# Patient Record
Sex: FEMALE | Race: OTHER | URBAN - METROPOLITAN AREA
[De-identification: names, ages, dates, MRNs, and addresses within clinical notes are randomized per-mention and may not be internally consistent; named-entity substitution may affect disease eponyms.]

---
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
## 2023-11-05 ENCOUNTER — HOSPITAL ENCOUNTER (EMERGENCY)
Facility: HOSPITAL | Age: 23
Discharge: HOME | End: 2023-11-05
Attending: STUDENT IN AN ORGANIZED HEALTH CARE EDUCATION/TRAINING PROGRAM
Payer: COMMERCIAL

## 2023-11-05 VITALS
OXYGEN SATURATION: 98 % | SYSTOLIC BLOOD PRESSURE: 128 MMHG | WEIGHT: 148 LBS | TEMPERATURE: 98.4 F | HEIGHT: 63 IN | RESPIRATION RATE: 16 BRPM | BODY MASS INDEX: 26.22 KG/M2 | DIASTOLIC BLOOD PRESSURE: 74 MMHG | HEART RATE: 73 BPM

## 2023-11-05 DIAGNOSIS — L23.9 ALLERGIC CONTACT DERMATITIS, UNSPECIFIED TRIGGER: Primary | ICD-10-CM

## 2023-11-05 DIAGNOSIS — M25.562 ACUTE PAIN OF LEFT KNEE: ICD-10-CM

## 2023-11-05 PROCEDURE — 99283 EMERGENCY DEPT VISIT LOW MDM: CPT

## 2023-11-05 RX ORDER — TRIAMCINOLONE ACETONIDE 5 MG/G
OINTMENT TOPICAL
Qty: 15 G | Refills: 1 | Status: SHIPPED | OUTPATIENT
Start: 2023-11-05 | End: 2023-11-12

## 2023-11-05 ASSESSMENT — LIFESTYLE VARIABLES
HOW MANY STANDARD DRINKS CONTAINING ALCOHOL DO YOU HAVE ON A TYPICAL DAY: 1 OR 2
HOW OFTEN DO YOU HAVE A DRINK CONTAINING ALCOHOL: MONTHLY OR LESS

## 2023-11-05 NOTE — ED NOTES
Patient does not appear to be in any acute distress/shows no evidence of clinical instability at this time. Provider has reviewed discharge instructions with the patient/family. The patient/family verbalized understanding instructions as well as need for follow up for any further symptoms. Discharge papers given, education provided, and any questions answered.         Milagros Guillen RN  11/05/23 [id number]

## 2024-08-12 ENCOUNTER — APPOINTMENT (OUTPATIENT)
Facility: HOSPITAL | Age: 24
End: 2024-08-12
Payer: COMMERCIAL

## 2024-08-12 ENCOUNTER — HOSPITAL ENCOUNTER (EMERGENCY)
Facility: HOSPITAL | Age: 24
Discharge: HOME | End: 2024-08-12
Attending: EMERGENCY MEDICINE
Payer: COMMERCIAL

## 2024-08-12 VITALS
WEIGHT: 160 LBS | TEMPERATURE: 98.3 F | BODY MASS INDEX: 28.35 KG/M2 | OXYGEN SATURATION: 97 % | RESPIRATION RATE: 16 BRPM | HEART RATE: 60 BPM | HEIGHT: 63 IN | SYSTOLIC BLOOD PRESSURE: 117 MMHG | DIASTOLIC BLOOD PRESSURE: 73 MMHG

## 2024-08-12 DIAGNOSIS — R10.32 LEFT LOWER QUADRANT ABDOMINAL PAIN: Primary | ICD-10-CM

## 2024-08-12 LAB
ALBUMIN SERPL-MCNC: 4.5 G/DL (ref 3.5–5.2)
ALBUMIN/GLOB SERPL: 1.5 (ref 1.1–2.2)
ALP SERPL-CCNC: 70 U/L (ref 35–104)
ALT SERPL-CCNC: 8 U/L (ref 10–35)
ANION GAP SERPL CALC-SCNC: 12 MMOL/L (ref 5–15)
APPEARANCE UR: CLEAR
AST SERPL-CCNC: 20 U/L (ref 10–35)
BACTERIA URNS QL MICRO: ABNORMAL /HPF
BASOPHILS # BLD: 0 K/UL (ref 0–1)
BASOPHILS NFR BLD: 1 % (ref 0–1)
BILIRUB SERPL-MCNC: 0.6 MG/DL (ref 0.2–1)
BILIRUB UR QL: NEGATIVE
BUN SERPL-MCNC: 8 MG/DL (ref 6–20)
BUN/CREAT SERPL: 16 (ref 12–20)
CALCIUM SERPL-MCNC: 8.9 MG/DL (ref 8.6–10)
CHLORIDE SERPL-SCNC: 104 MMOL/L (ref 98–107)
CO2 SERPL-SCNC: 24 MMOL/L (ref 22–29)
COLOR UR: ABNORMAL
CREAT SERPL-MCNC: 0.51 MG/DL (ref 0.5–0.9)
DIFFERENTIAL METHOD BLD: ABNORMAL
EOSINOPHIL # BLD: 0.1 K/UL (ref 0–0.4)
EOSINOPHIL NFR BLD: 1 %
EPITH CASTS URNS QL MICRO: ABNORMAL /LPF
ERYTHROCYTE [DISTWIDTH] IN BLOOD BY AUTOMATED COUNT: 12.8 % (ref 11.5–14.5)
GLOBULIN SER CALC-MCNC: 3 G/DL (ref 2–4)
GLUCOSE SERPL-MCNC: 91 MG/DL (ref 65–100)
GLUCOSE UR STRIP.AUTO-MCNC: NEGATIVE MG/DL
HCG UR QL: NEGATIVE
HCT VFR BLD AUTO: 41.3 % (ref 35–47)
HGB BLD-MCNC: 13.8 G/DL (ref 11.5–16)
HGB UR QL STRIP: ABNORMAL
IMM GRANULOCYTES # BLD AUTO: 0 K/UL (ref 0–0.04)
IMM GRANULOCYTES NFR BLD AUTO: 0 % (ref 0–0.5)
KETONES UR QL STRIP.AUTO: NEGATIVE MG/DL
LEUKOCYTE ESTERASE UR QL STRIP.AUTO: NEGATIVE
LIPASE SERPL-CCNC: 24 U/L (ref 13–60)
LYMPHOCYTES # BLD: 2.4 K/UL (ref 0.8–3.5)
LYMPHOCYTES NFR BLD: 33 % (ref 12–49)
MCH RBC QN AUTO: 28.7 PG (ref 26–34)
MCHC RBC AUTO-ENTMCNC: 33.4 G/DL (ref 30–36.5)
MCV RBC AUTO: 85.9 FL (ref 80–99)
MONOCYTES # BLD: 0.4 K/UL (ref 0–1)
MONOCYTES NFR BLD: 5 % (ref 5–13)
NEUTS SEG # BLD: 4.2 K/UL (ref 1.8–8)
NEUTS SEG NFR BLD: 60 % (ref 32–75)
NITRITE UR QL STRIP.AUTO: NEGATIVE
NRBC # BLD: 0 K/UL (ref 0–0.01)
NRBC BLD-RTO: 0 PER 100 WBC
PH UR STRIP: 7 (ref 5–8)
PLATELET # BLD AUTO: 238 K/UL (ref 150–400)
PMV BLD AUTO: 10.4 FL (ref 8.9–12.9)
POTASSIUM SERPL-SCNC: 3.7 MMOL/L (ref 3.5–5.1)
PROT SERPL-MCNC: 7.5 G/DL (ref 6.4–8.3)
PROT UR STRIP-MCNC: NEGATIVE MG/DL
RBC # BLD AUTO: 4.81 M/UL (ref 3.8–5.2)
RBC #/AREA URNS HPF: ABNORMAL /HPF
SODIUM SERPL-SCNC: 140 MMOL/L (ref 136–145)
SP GR UR REFRACTOMETRY: 1.02 (ref 1–1.03)
UROBILINOGEN UR QL STRIP.AUTO: 0.2 EU/DL (ref 0.2–1)
WBC # BLD AUTO: 7.1 K/UL (ref 3.6–11)
WBC URNS QL MICRO: ABNORMAL /HPF (ref 0–4)

## 2024-08-12 PROCEDURE — 80053 COMPREHEN METABOLIC PANEL: CPT

## 2024-08-12 PROCEDURE — 83690 ASSAY OF LIPASE: CPT

## 2024-08-12 PROCEDURE — 99285 EMERGENCY DEPT VISIT HI MDM: CPT

## 2024-08-12 PROCEDURE — 81025 URINE PREGNANCY TEST: CPT

## 2024-08-12 PROCEDURE — 6360000004 HC RX CONTRAST MEDICATION: Performed by: EMERGENCY MEDICINE

## 2024-08-12 PROCEDURE — 81001 URINALYSIS AUTO W/SCOPE: CPT

## 2024-08-12 PROCEDURE — 74177 CT ABD & PELVIS W/CONTRAST: CPT

## 2024-08-12 PROCEDURE — 85025 COMPLETE CBC W/AUTO DIFF WBC: CPT

## 2024-08-12 PROCEDURE — 96374 THER/PROPH/DIAG INJ IV PUSH: CPT

## 2024-08-12 PROCEDURE — 36415 COLL VENOUS BLD VENIPUNCTURE: CPT

## 2024-08-12 PROCEDURE — 6360000002 HC RX W HCPCS: Performed by: PHYSICIAN ASSISTANT

## 2024-08-12 PROCEDURE — 2580000003 HC RX 258: Performed by: PHYSICIAN ASSISTANT

## 2024-08-12 RX ORDER — KETOROLAC TROMETHAMINE 30 MG/ML
30 INJECTION, SOLUTION INTRAMUSCULAR; INTRAVENOUS ONCE
Status: COMPLETED | OUTPATIENT
Start: 2024-08-12 | End: 2024-08-12

## 2024-08-12 RX ORDER — 0.9 % SODIUM CHLORIDE 0.9 %
1000 INTRAVENOUS SOLUTION INTRAVENOUS ONCE
Status: COMPLETED | OUTPATIENT
Start: 2024-08-12 | End: 2024-08-12

## 2024-08-12 RX ADMIN — IOPAMIDOL 100 ML: 755 INJECTION, SOLUTION INTRAVENOUS at 13:38

## 2024-08-12 RX ADMIN — KETOROLAC TROMETHAMINE 30 MG: 30 INJECTION, SOLUTION INTRAMUSCULAR at 13:02

## 2024-08-12 RX ADMIN — SODIUM CHLORIDE 1000 ML: 9 INJECTION, SOLUTION INTRAVENOUS at 13:03

## 2024-08-12 ASSESSMENT — PAIN DESCRIPTION - LOCATION
LOCATION: ABDOMEN
LOCATION: ABDOMEN
LOCATION: ABDOMEN;BACK

## 2024-08-12 ASSESSMENT — PAIN DESCRIPTION - DESCRIPTORS
DESCRIPTORS: JABBING
DESCRIPTORS: ACHING

## 2024-08-12 ASSESSMENT — PAIN - FUNCTIONAL ASSESSMENT
PAIN_FUNCTIONAL_ASSESSMENT: 0-10
PAIN_FUNCTIONAL_ASSESSMENT: 0-10

## 2024-08-12 ASSESSMENT — PAIN DESCRIPTION - ORIENTATION
ORIENTATION: LEFT;MID;UPPER
ORIENTATION: LEFT

## 2024-08-12 ASSESSMENT — LIFESTYLE VARIABLES
HOW OFTEN DO YOU HAVE A DRINK CONTAINING ALCOHOL: 2-4 TIMES A MONTH
HOW MANY STANDARD DRINKS CONTAINING ALCOHOL DO YOU HAVE ON A TYPICAL DAY: 1 OR 2

## 2024-08-12 ASSESSMENT — PAIN SCALES - GENERAL
PAINLEVEL_OUTOF10: 7
PAINLEVEL_OUTOF10: 7
PAINLEVEL_OUTOF10: 3

## 2024-08-12 NOTE — ED PROVIDER NOTES
EMERGENCY DEPARTMENT PHYSICIAN NOTE     Patient: Najma Camacho     Time of Service: 8/12/2024 12:21 PM     Chief complaint:   Chief Complaint   Patient presents with    Abdominal Pain        HISTORY:  Patient is a 24 y.o. female who presents to the emergency department with complaints of abdominal pain.  States she has had intermittent discomfort over the past 2 years and has been seen by urgent care and [facility].  States she was provided with ibuprofen but denies have any any imaging.  States she is unsure who she was supposed to follow-up with.  States she came in today because \"I just do not want to deal with it anymore\".  States the pain is specific to a spot in her left upper abdomen and then to her left flank and is always in the same spots.  Denies associated fever or chills.  No nausea or vomiting.  Denies any problems with her bowels.  Denies chest pain or shortness of breath.      History reviewed. No pertinent past medical history.     Past Surgical History:   Procedure Laterality Date    WISDOM TOOTH EXTRACTION          History reviewed. No pertinent family history.     Social History     Socioeconomic History    Marital status: Single     Spouse name: None    Number of children: None    Years of education: None    Highest education level: None   Tobacco Use    Smoking status: Never   Vaping Use    Vaping status: Some Days   Substance and Sexual Activity    Alcohol use: Yes     Comment: social    Drug use: Yes     Types: Marijuana (Weed)        Current Medications: Reviewed in chart.    Allergies: No Known Allergies       REVIEW OF SYSTEMS: See HPI for pertinent positives and negatives.      PHYSICAL EXAM:  /75   Pulse 66   Temp 98.6 °F (37 °C) (Oral)   Resp 18   Ht 1.6 m (5' 3\")   Wt 72.6 kg (160 lb)   SpO2 97%   BMI 28.34 kg/m²    Physical Exam  Vitals and nursing note reviewed.   Constitutional:       General: She is not in acute distress.     Appearance: She is not

## 2024-08-12 NOTE — ED NOTES
Patient does not appear to be in any acute distress/shows no evidence of clinical instability at this time.     Provider has reviewed discharge instructions with the patient.  The patient verbalized understanding of discharge instructions as well as need for follow up for any worsening or further symptoms. PCP follow up recommended as well as specialist follow up: GI      Discharge papers given to patient, education provided, and all questions answered. Patient discharged by provider.    Patient leaving ED in private vehicle, ambulatory. Pt A&OX4, RA.

## 2024-08-12 NOTE — ED TRIAGE NOTES
Pt ambulatory to ED with complaint of L sided abdominal pain that radiates toward the back that started 2 years ago. PT denies n/v/d.

## 2025-01-03 LAB
ABO, EXTERNAL RESULT: NORMAL
C. TRACHOMATIS, EXTERNAL RESULT: NEGATIVE
HEP B, EXTERNAL RESULT: NEGATIVE
HEPATITIS C ANTIBODY, EXTERNAL RESULT: NEGATIVE
HIV, EXTERNAL RESULT: NEGATIVE
N. GONORRHOEAE, EXTERNAL RESULT: NEGATIVE
RH FACTOR, EXTERNAL RESULT: POSITIVE
RPR, EXTERNAL RESULT: NONREACTIVE
RUBELLA TITER, EXTERNAL RESULT: NORMAL

## 2025-04-04 ENCOUNTER — ROUTINE PRENATAL (OUTPATIENT)
Age: 25
End: 2025-04-04
Payer: COMMERCIAL

## 2025-04-04 VITALS — OXYGEN SATURATION: 97 % | DIASTOLIC BLOOD PRESSURE: 65 MMHG | SYSTOLIC BLOOD PRESSURE: 113 MMHG | HEART RATE: 89 BPM

## 2025-04-04 DIAGNOSIS — O24.419 GDM (GESTATIONAL DIABETES MELLITUS): ICD-10-CM

## 2025-04-04 DIAGNOSIS — O24.415 GESTATIONAL DIABETES MELLITUS (GDM) IN SECOND TRIMESTER CONTROLLED ON ORAL HYPOGLYCEMIC DRUG: Primary | ICD-10-CM

## 2025-04-04 PROCEDURE — 99214 OFFICE O/P EST MOD 30 MIN: CPT

## 2025-04-04 RX ORDER — BLOOD SUGAR DIAGNOSTIC
STRIP MISCELLANEOUS
COMMUNITY
Start: 2025-02-27

## 2025-04-04 RX ORDER — LANCETS 33 GAUGE
EACH MISCELLANEOUS
COMMUNITY
Start: 2025-02-27

## 2025-04-04 RX ORDER — BLOOD-GLUCOSE METER
EACH MISCELLANEOUS
COMMUNITY
Start: 2025-02-27

## 2025-04-04 NOTE — PROGRESS NOTES
Assessment & Plan   ASSESSMENT/PLAN:  1. Gestational diabetes mellitus (GDM) in second trimester controlled on oral hypoglycemic drug    Najma is a 23 y/o  seen today for a new GDM visit.  We discussed since this diagnosis is early in pregnancy it is possibly T2DM.    GDM A2 vs T2DM  - 25: 1 hr gtt fail 169; 25: 3 hr gtt fail 105 217 223 204  - Pt denies history of diabetes.  Confirms family history of T2DM on maternal side of family.   - New GDM education provided. See below.   - Pt has supplies and has started checking blood sugars.  - No log today.  Reports fasting 100, 2hr pp 100-200 but unsure if most values under 120.  She is to upload log to [facility].   - Current regimen Metformin 500mg daily. I recommended she start Metformin 500mg BID and discussed strong possibility of adding insulin after assessment of log and subsequent blood sugars.   - Pt aware of MFM New Pt U/S appt scheduled 4/10  - Recommend baseline labs: A1C, CBC, CMP, PC ratio, if not already complemented.   - Follow up: Pt aware of MFM New Pt U/S appt scheduled 4/10    The patient has been newly diagnosed with Gestational Diabetes (GDM). During today’s office visit, verbal and written education was provided on the following topics:   Gestational Diabetes (GDM) - An explanation of the condition, its impact on pregnancy, management strategies. The patient was informed that follow-up postpartum is important to assess blood glucose levels, as there is an increased risk of developing Type 2 Diabetes after GDM diagnosis.    Nutrition and Carbohydrate Choices - Guidance on making healthy food choices, including carb counting and portion sizes.  Self-Administration of Finger Sticks - Instruction on how to properly use a glucose meter to check blood sugars provided although pt already checking blood sugars.   Blood Glucose Goals - Target ranges for fasting and postprandial (2 hours after meals) blood glucose levels.  Logging Blood Sugars -

## 2025-04-08 ENCOUNTER — NURSE ONLY (OUTPATIENT)
Age: 25
End: 2025-04-08

## 2025-04-08 RX ORDER — INSULIN HUMAN 100 [IU]/ML
10 INJECTION, SUSPENSION SUBCUTANEOUS 2 TIMES DAILY
Qty: 15 ML | Refills: 1 | Status: SHIPPED | OUTPATIENT
Start: 2025-04-08 | End: 2025-04-09

## 2025-04-08 NOTE — PROGRESS NOTES
Spoke to patient about insulin teaching. Patient would like a virtual appointment. This RN notified front office staff for scheduling.

## 2025-04-09 ENCOUNTER — ROUTINE PRENATAL (OUTPATIENT)
Age: 25
End: 2025-04-09
Payer: COMMERCIAL

## 2025-04-09 DIAGNOSIS — O24.419 GDM, CLASS A2: Primary | ICD-10-CM

## 2025-04-09 PROCEDURE — 99214 OFFICE O/P EST MOD 30 MIN: CPT

## 2025-04-09 RX ORDER — INSULIN HUMAN 100 [IU]/ML
INJECTION, SUSPENSION SUBCUTANEOUS
Qty: 15 ML | Refills: 1 | Status: SHIPPED | OUTPATIENT
Start: 2025-04-09

## 2025-04-09 NOTE — PROGRESS NOTES
During today's office visit, verbal and written education was provided to the patient on the following topics:  Insulin Pens and Needles - Verbal and written instruction on how to properly use insulin pens and needles, including proper storage and disposal methods.  Signs and Symptoms of Hypoglycemia - Education was provided on recognizing the signs and symptoms of low blood sugar levels below 70 mg/dl and treatment.   Nutrition Guidelines - Verbal and written guidance on the importance of not skipping meals and eating a bedtime snack high in protein nightly to help maintain stable blood glucose levels.  Insulin Pen Demonstration - A demonstration was provided to the patient on how to use an insulin pen and administer insulin subcutaneously using a demo pen and fat pad. The patient was able to successfully demonstrate the technique on their return demonstration.  New Medication Education - The patient has been prescribed NPH 13 units in the morning and 10 units at night.. Verbal and written instructions were provided regarding the new medication, including dosing, possible side effects, and any specific instructions for use.  Prescriptions:  Prescriptions for the new medication were sent to the patient's preferred pharmacy.  Patient Understanding:  The patient verbalized understanding of the instructions and demonstrated the correct technique for insulin administration. No further questions were raised at this time.

## 2025-04-10 ENCOUNTER — ROUTINE PRENATAL (OUTPATIENT)
Age: 25
End: 2025-04-10
Payer: COMMERCIAL

## 2025-04-10 VITALS — SYSTOLIC BLOOD PRESSURE: 121 MMHG | HEART RATE: 84 BPM | DIASTOLIC BLOOD PRESSURE: 74 MMHG

## 2025-04-10 DIAGNOSIS — Z34.90 PREGNANCY, UNSPECIFIED GESTATIONAL AGE: ICD-10-CM

## 2025-04-10 DIAGNOSIS — Z34.90 PREGNANCY, UNSPECIFIED GESTATIONAL AGE: Primary | ICD-10-CM

## 2025-04-10 PROCEDURE — 76811 OB US DETAILED SNGL FETUS: CPT | Performed by: OBSTETRICS & GYNECOLOGY

## 2025-04-10 PROCEDURE — 99204 OFFICE O/P NEW MOD 45 MIN: CPT | Performed by: OBSTETRICS & GYNECOLOGY

## 2025-04-10 RX ORDER — ASPIRIN 81 MG/1
81 TABLET ORAL DAILY
COMMUNITY

## 2025-04-10 NOTE — PROGRESS NOTES
Patient was seen 4/10/2025      Please look under media to view full consult and ultrasound report in ViewPoint.         Abdias Dumont MD  Maternal Fetal Medicine

## 2025-04-10 NOTE — PROGRESS NOTES
RN discussed home BG log with patient. There are some severe range values (200's) on the log. When RN asked patient about those values, patient admitted that she does not take her metformin as prescribed. Patient discuss plan with Dr Dumont. See MD note in media.

## 2025-04-10 NOTE — PROGRESS NOTES
Assessment & Plan   ASSESSMENT/PLAN:  1. GDM, class A2    Najma is a 24 y/o  seen today for insulin administration instructions    GDMA2 vs T2DM  - Log reviewed 100% Fasting elevated (105-150), 10/12 2hr pp elevated () on current regimen Metformin 500mg BID.  - New regimen: Metformin 500mg BID, NPH 13/10  - Insulin administration instructions provided by RN.  See RN note.  All questions answered.   - Pt reports she ran out of testing strips and [facility] should have them in stock tomorrow.    - Pt is aware to inform our office if she has difficulty obtaining her supplies tomorrow so we can possibly transfer to another pharmacy if available elsewhere.  - Pt advised to start insulin when she obtains refill on testing strips in order to properly monitor her blood sugars.   - Pt aware MFM u/s appt 4/10     Subjective   Najma Camacho (:  2000) is a 25 y.o. female,New patient, here for evaluation of the following chief complaint(s):  1. GDM, class A2    Objective   Physical Exam  Vitals reviewed. Nursing note reviewed: vitals trend reviewed.  Constitutional:       Appearance: Normal appearance.   Neurological:      Mental Status: She is alert.   Psychiatric:         Mood and Affect: Mood normal.         Judgment: Judgment normal.        On 25 I have reviewed previous notes, test results and consulted face to face with the patient discussing the diagnosis and importance of compliance with the treatment plan as well as documenting on the day of the visit.   An electronic signature was used to authenticate this note.    --KELLY Newell - CNP

## 2025-04-11 NOTE — PROCEDURES
PATIENT: OWEN SCHULZ   -  : 2000   -  DOS:04/10/2025   -  INTERPRETING PROVIDER:Abdias Dumont,   Indication  ========    GDM    Method  ======    Transabdominal ultrasound examination. View: suboptimal due to maternal acoustic properties, unfavorable fetal position, shadowing from fetal limbs, and excessive fetal  movement    Dating  ======    LMP on: 2024  Cycle: regular cycle  GA by LMP 22 w + 2 d  HENRY by LMP: 2025  Previous Ultrasound on: 1/3/2025  Type of prior assessment: GA  GA at prior assessment date 8 w + 6 d  GA by previous U/S 22 w + 5 d  HENRY by previous Ultrasound: 2025  Ultrasound examination on: 4/10/2025  GA by U/S based upon: AC, BPD, Femur, HC  GA by U/S 23 w + 2 d  HENRY by U/S: 2025  Assigned: based on the LMP, selected on 04/10/2025  Assigned GA 22 w + 2 d  Assigned HENRY: 2025    Fetal Growth Overview  =================    Exam date        GA              BPD (mm)          HC (mm)              AC (mm)               FL (mm)             HL (mm)          EFW (g)  04/10/2025        22w 2d        57.2     87%        205.2    52%        196.7     94%        39.5    54%        37     68%        604    93%    Fetal Biometry  ============    Standard  BPD 57.2 mm 23w 4d 87% Hadlock  OFD 71.0 mm 23w 5d 87% Allie  .2 mm 22w 4d 52% Hadlock  Cerebellum tr 26.0 mm 23w 3d 96% Hill  Nuchal fold 3.2 mm  .7 mm 24w 2d 94% Hadlock  Femur 39.5 mm 22w 5d 54% Hadlock  Humerus 37.0 mm 22w 6d 68% Allie   g 23w 2d 93% Hadlock  EFW (lb) 1 lb  EFW (oz) 5 oz  EFW by: Hadlock (BPD-HC-AC-FL)  Extended   6.1 mm  CM 4.3 mm  14% Nicolaides  Nasal bone 6.4 mm  Head / Face / Neck  Nasal bone: present  Other Structures   bpm    General Evaluation  ==============    Cardiac activity present.  bpm. Fetal movements: visualized. Presentation: Cephalic  Placenta: Placental site: anterior, appropriate distance from the internal os. Placental edge-to-cervical os

## 2025-05-12 ENCOUNTER — ROUTINE PRENATAL (OUTPATIENT)
Age: 25
End: 2025-05-12
Payer: COMMERCIAL

## 2025-05-12 VITALS — HEART RATE: 72 BPM | DIASTOLIC BLOOD PRESSURE: 74 MMHG | OXYGEN SATURATION: 96 % | SYSTOLIC BLOOD PRESSURE: 114 MMHG

## 2025-05-12 DIAGNOSIS — Z34.90 PREGNANCY, UNSPECIFIED GESTATIONAL AGE: ICD-10-CM

## 2025-05-12 PROCEDURE — 99214 OFFICE O/P EST MOD 30 MIN: CPT | Performed by: STUDENT IN AN ORGANIZED HEALTH CARE EDUCATION/TRAINING PROGRAM

## 2025-05-12 PROCEDURE — 76816 OB US FOLLOW-UP PER FETUS: CPT | Performed by: STUDENT IN AN ORGANIZED HEALTH CARE EDUCATION/TRAINING PROGRAM

## 2025-05-12 NOTE — PROGRESS NOTES
Patient was seen 5/12/2025      Please look under media to view full consult and ultrasound report in ViewPoint.         Marine Crowder MD   Maternal Fetal Medicine

## 2025-05-12 NOTE — PROCEDURES
PATIENT: OWEN SCHULZ   -  : 2000   -  DOS:2025   -  INTERPRETING PROVIDER:Marine Crowder,   Indication  ========    GDM    Method  ======    Transabdominal ultrasound examination. View: suboptimal due to unfavorable fetal position    Pregnancy  =========    Kee pregnancy. Number of fetuses: 1    Dating  ======    LMP on: 2024  Cycle: regular cycle  GA by LMP 26 w + 6 d  HENRY by LMP: 2025  Previous Ultrasound on: 1/3/2025  Type of prior assessment: GA  GA at prior assessment date 8 w + 6 d  GA by previous U/S 27 w + 2 d  HENRY by previous Ultrasound: 2025  Ultrasound examination on: 2025  GA by U/S based upon: AC, BPD, Femur, HC  GA by U/S 28 w + 5 d  HENRY by U/S: 2025  Assigned: based on the LMP, selected on 04/10/2025  Assigned GA 26 w + 6 d  Assigned HENRY: 2025    Fetal Biometry  ============    Standard  BPD 72.8 mm 29w 1d 96% Hadlock  OFD 92.7 mm 29w 6d >99% Allie  .7 mm 28w 6d 84% Hadlock  .9 mm 29w 1d 95% Hadlock  Femur 51.7 mm 27w 4d 59% Hadlock  EFW 1,260 g 28w 2d 95% Hadlock  EFW (lb) 2 lb  EFW (oz) 12 oz  EFW by: Hadlock (BPD-HC-AC-FL)  Other Structures   bpm    General Evaluation  ==============    Cardiac activity present.  bpm. Fetal movements: visualized. Presentation: Transverse, head to maternal left  Placenta: Placental site: anterior, appropriate distance from the internal os  Umbilical cord: Cord vessels: 3 vessel cord. Insertion site: central  Amniotic fluid: Amount of AF: normal. MVP 8.5 cm. VANDA 21.6 cm. Q1 8.5 cm, Q2 5.0 cm, Q3 3.7 cm, Q4 4.4 cm    Fetal Anatomy  ===========    Face  Mandible: NOT VISUALIZED  4-chamber view: SUBOPTIMAL  RVOT view: SUBOPTIMAL  LVOT view: SUBOPTIMAL  Heart / Thorax  Aortic arch view: NOT VISUALIZED  Bicaval view: NOT VISUALIZED  Ductal arch view: NOT VISUALIZED  Stomach: normal  Kidneys: normal  Bladder: normal  Wants to know fetal sex: yes    Findings  =======    Intrauterine Kee

## 2025-05-12 NOTE — PROGRESS NOTES
Patient given education on how to administer insulin to herself or have her  give her the insulin. Patient and  verbalized understand and perform a teach back to the RN.

## 2025-05-20 ENCOUNTER — ROUTINE PRENATAL (OUTPATIENT)
Age: 25
End: 2025-05-20
Payer: COMMERCIAL

## 2025-05-20 VITALS — HEART RATE: 82 BPM | SYSTOLIC BLOOD PRESSURE: 115 MMHG | DIASTOLIC BLOOD PRESSURE: 70 MMHG

## 2025-05-20 DIAGNOSIS — O24.319 PRE-EXISTING DIABETES MELLITUS DURING PREGNANCY, ANTEPARTUM: Primary | ICD-10-CM

## 2025-05-20 DIAGNOSIS — Z34.90 PREGNANCY, UNSPECIFIED GESTATIONAL AGE: ICD-10-CM

## 2025-05-20 PROCEDURE — 76818 FETAL BIOPHYS PROFILE W/NST: CPT | Performed by: STUDENT IN AN ORGANIZED HEALTH CARE EDUCATION/TRAINING PROGRAM

## 2025-05-20 PROCEDURE — 76819 FETAL BIOPHYS PROFIL W/O NST: CPT | Performed by: STUDENT IN AN ORGANIZED HEALTH CARE EDUCATION/TRAINING PROGRAM

## 2025-05-20 RX ORDER — INSULIN LISPRO 100 [IU]/ML
5 INJECTION, SOLUTION INTRAVENOUS; SUBCUTANEOUS
Qty: 2 ADJUSTABLE DOSE PRE-FILLED PEN SYRINGE | Refills: 3 | Status: SHIPPED | OUTPATIENT
Start: 2025-05-20 | End: 2025-05-20

## 2025-05-20 RX ORDER — INSULIN LISPRO 100 [IU]/ML
INJECTION, SOLUTION INTRAVENOUS; SUBCUTANEOUS
Qty: 2 ADJUSTABLE DOSE PRE-FILLED PEN SYRINGE | Refills: 3 | Status: SHIPPED | OUTPATIENT
Start: 2025-05-20

## 2025-05-21 NOTE — PROCEDURES
PATIENT: OWEN SCHULZ   -  : 2000   -  DOS:2025   -  INTERPRETING PROVIDER:Marine Crowder,   Indication  ========    GDM    Method  ======    External Fetal Monitor and transabdominal ultrasound examination. View: Good view    Pregnancy  =========    Kee pregnancy. Number of fetuses: 1    Dating  ======    LMP on: 2024  Cycle: regular cycle  GA by LMP 28 w + 0 d  HENRY by LMP: 2025  Previous Ultrasound on: 1/3/2025  Type of prior assessment: GA  GA at prior assessment date 8 w + 6 d  GA by previous U/S 28 w + 3 d  HENRY by previous Ultrasound: 2025  Assigned: based on the LMP, selected on 04/10/2025  Assigned GA 28 w + 0 d  Assigned HENRY: 2025    General Evaluation  ==============    Cardiac activity present.  bpm. Fetal movements: visualized. Presentation: Transverse, head to maternal right  Placenta: Placental site: anterior, appropriate distance from the internal os  Umbilical cord: Cord vessels: 3 vessel cord    Fetal Anatomy  ===========    Face  Mandible: NOT VISUALIZED  4-chamber view: SUBOPTIMAL  RVOT view: SUBOPTIMAL  LVOT view: SUBOPTIMAL  Heart / Thorax  Aortic arch view: NOT VISUALIZED  Bicaval view: NOT VISUALIZED  Ductal arch view: NOT VISUALIZED  Stomach: normal  Kidneys: normal  Bladder: normal  Wants to know fetal sex: yes    Amniotic Fluid Assessment  =====================    Amount of AF: moderate polyhydramnios  MVP 9.6 cm. VANDA 30.4 cm. Q1 5.4 cm, Q2 9.6 cm, Q3 8.8 cm, Q4 6.6 cm    Biophysical Profile  ==============    2: Fetal breathing movements  2: Gross body movements  2: Fetal tone  2: Amniotic fluid volume  NST: reactive  10/10 Biophysical profile score  Interpretation: normal    Non Stress Test  =============    NST interpretation: reactive. Test duration 20 min. Baseline  bpm. Baseline variability: moderate. Accelerations: present. Decelerations: absent. Uterine activity:  absent    Findings  =======    Intrauterine Kee

## 2025-05-21 NOTE — PROGRESS NOTES
Patient was seen 5/21/2025      Please look under media to view full consult and ultrasound report in ViewPoint.         Marine Crowder MD   Maternal Fetal Medicine

## 2025-05-29 ENCOUNTER — TELEPHONE (OUTPATIENT)
Age: 25
End: 2025-05-29

## 2025-05-29 NOTE — TELEPHONE ENCOUNTER
Lvm for patient to call office at [phone]. Need to change time of appointment on 6/12. Needs to be scheduled in room 1.

## 2025-06-04 ENCOUNTER — ROUTINE PRENATAL (OUTPATIENT)
Age: 25
End: 2025-06-04
Payer: COMMERCIAL

## 2025-06-04 VITALS — DIASTOLIC BLOOD PRESSURE: 68 MMHG | HEART RATE: 94 BPM | SYSTOLIC BLOOD PRESSURE: 109 MMHG

## 2025-06-04 DIAGNOSIS — Z34.90 PREGNANCY, UNSPECIFIED GESTATIONAL AGE: ICD-10-CM

## 2025-06-04 PROCEDURE — 99214 OFFICE O/P EST MOD 30 MIN: CPT | Performed by: STUDENT IN AN ORGANIZED HEALTH CARE EDUCATION/TRAINING PROGRAM

## 2025-06-04 PROCEDURE — 76819 FETAL BIOPHYS PROFIL W/O NST: CPT | Performed by: STUDENT IN AN ORGANIZED HEALTH CARE EDUCATION/TRAINING PROGRAM

## 2025-06-04 NOTE — PROGRESS NOTES
Provided verbal communication on fetal movement counts, labor precautions, and signs/symptoms of pre-eclampsia.  Patient verbalized understanding of the information shared.  All patient questions were addressed.

## 2025-06-05 NOTE — PROGRESS NOTES
Patient was seen 6/5/2025      Please look under media to view full consult and ultrasound report in ViewPoint.         Marine Crowder MD   Maternal Fetal Medicine

## 2025-06-05 NOTE — PROCEDURES
PATIENT: OWEN SCHULZ   -  : 2000   -  DOS:2025   -  INTERPRETING PROVIDER:Marine Crowder,   Indication  ========    GDM    Method  ======    Transabdominal ultrasound examination. View: suboptimal due to unfavorable fetal position    Pregnancy  =========    Kee pregnancy. Number of fetuses: 1    Dating  ======    LMP on: 2024  Cycle: regular cycle  GA by LMP 30 w + 1 d  HENRY by LMP: 2025  Previous Ultrasound on: 1/3/2025  Type of prior assessment: GA  GA at prior assessment date 8 w + 6 d  GA by previous U/S 30 w + 4 d  HENRY by previous Ultrasound: 2025  Assigned: based on the LMP, selected on 04/10/2025  Assigned GA 30 w + 1 d  Assigned HENRY: 2025    General Evaluation  ==============    Cardiac activity present.  bpm. Fetal movements: visualized. Presentation: BREECH  Placenta: Placental site: anterior, appropriate distance from the internal os  Umbilical cord: Cord vessels: 3 vessel cord    Fetal Anatomy  ===========    Face  Mandible: NOT VISUALIZED  4-chamber view: normal  RVOT view: normal  LVOT view: normal  Heart / Thorax  Aortic arch view: normal  Bicaval view: normal  Ductal arch view: NOT VISUALIZED  Stomach: normal  Kidneys: normal  Bladder: normal  Wants to know fetal sex: yes    Amniotic Fluid Assessment  =====================    Amount of AF: normal  MVP 6.1 cm. VANDA 15.4 cm. Q1 3.6 cm, Q2 2.5 cm, Q3 3.2 cm, Q4 6.1 cm    Biophysical Profile  ==============    2: Fetal breathing movements  2: Gross body movements  2: Fetal tone  2: Amniotic fluid volume  8 Biophysical profile score    Findings  =======    Intrauterine Kee pregnancy at 30w 1d by clinical dates.  Amniotic fluid: normal.  Placenta is anterior, appropriate distance from the internal os.  BREECH presentation.  Biophysical profile score is 8/8.    The ultrasound findings as listed above and diagnostic limitations of ultrasound imaging, including inability to exclude all anomalies, have

## 2025-06-12 ENCOUNTER — TELEMEDICINE (OUTPATIENT)
Age: 25
End: 2025-06-12

## 2025-06-12 VITALS — DIASTOLIC BLOOD PRESSURE: 73 MMHG | HEART RATE: 81 BPM | SYSTOLIC BLOOD PRESSURE: 123 MMHG

## 2025-06-12 DIAGNOSIS — Z34.90 PREGNANCY, UNSPECIFIED GESTATIONAL AGE: ICD-10-CM

## 2025-06-12 NOTE — PROGRESS NOTES
Patient was seen 6/12/2025      Please look under media to view full consult and ultrasound report in ViewPoint.         Marine Crowder MD   Maternal Fetal Medicine

## 2025-06-12 NOTE — PROCEDURES
dates.  EFW is 2488 g at >99%, abdominal circumference at >99%.  Anatomy visualized as stated above.  Amniotic fluid: normal.  Placenta is anterior, appropriate distance from the internal os.  Cephalic presentation.  Biophysical profile score is 8/8.    The ultrasound findings as listed above and diagnostic limitations of ultrasound imaging, including inability to exclude all anomalies, have been reviewed with the patient. All  questions and concerns addressed.    Consultation  ==========    OWEN is 25 yrs of age,  at 31w 2d.    Patient presented today for a BPP. The biophysical profile is 8/8. There is a normal volume of amniotic fluid. The ultrasound findings were discussed with the patient.  Patient?s interval obstetrical history is reported as benign and uneventful. Patient denies any obstetrical complaints. Patient reports good fetal movements. Patient denies  regular/frequent contractions, vaginal bleeding or leakage of fluid.    (1) Pregestational DM:  - Cont ldASA for preE prophylaxis  - Recommend baseline preE labs CBC/CMP and HgbA1C is recommended once a trimester with a goal of less than 6%> 1/3/25 CBC wnl, 25 AST/ALT wnl, P:C ratio  0.1, A1C 6.2,  - Ophthalmology consult to rule out retinopathy. Reviewed this recommendation again today.  - sp fetal echo  wnl  - Kick count instructions, PIH and PTL precautions reviewed.    Log reviewed: NO log provided today thus no changes made; encouraged patient to send log for evaluation.  - Current regimen: Metformin 500 mg BID, NPH 15/15, Lispro 5/5.    Macrosomia;  - most likely due to T2DM uncontrolled and not taking medications    Moderate Polyhydramnios - resolved today    25: CF/SMA/FXS neg ([facility])  25: hemo elec WNL  25: NIPT normal male ([facility])  3/1/25: msAFP neg    Recommendations  ==============    Continue weekly visits for  surveillance.    - Regimen : Metformin 500 mg BID, NPH 15/15, Lispro 5//5.  - Bring

## 2025-06-16 ENCOUNTER — APPOINTMENT (OUTPATIENT)
Facility: HOSPITAL | Age: 25
End: 2025-06-16
Payer: COMMERCIAL

## 2025-06-16 ENCOUNTER — HOSPITAL ENCOUNTER (EMERGENCY)
Facility: HOSPITAL | Age: 25
Discharge: HOME | End: 2025-06-16
Attending: STUDENT IN AN ORGANIZED HEALTH CARE EDUCATION/TRAINING PROGRAM
Payer: COMMERCIAL

## 2025-06-16 VITALS
WEIGHT: 197.8 LBS | SYSTOLIC BLOOD PRESSURE: 115 MMHG | RESPIRATION RATE: 18 BRPM | HEIGHT: 63 IN | OXYGEN SATURATION: 100 % | DIASTOLIC BLOOD PRESSURE: 64 MMHG | BODY MASS INDEX: 35.05 KG/M2 | TEMPERATURE: 98.2 F | HEART RATE: 84 BPM
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
## 2025-06-16 DIAGNOSIS — Z3A.31 31 WEEKS GESTATION OF PREGNANCY: ICD-10-CM

## 2025-06-16 DIAGNOSIS — M79.601 RIGHT ARM PAIN: ICD-10-CM

## 2025-06-16 DIAGNOSIS — M79.604 RIGHT LEG PAIN: ICD-10-CM

## 2025-06-16 LAB
ABDOMINAL CIRCUMFERENCE: 27.92 CM
ABDOMINAL CIRCUMFERENCE: 27.92 CM
BIPARIETAL DIAMETER: 8.63 CM
BIPARIETAL DIAMETER: 8.63 CM
HEAD CIRCUMFERENCE: 29.87 CM
HEAD CIRCUMFERENCE: 29.87 CM

## 2025-06-16 PROCEDURE — 99284 EMERGENCY DEPT VISIT MOD MDM: CPT

## 2025-06-16 PROCEDURE — 76815 OB US LIMITED FETUS(S): CPT

## 2025-06-16 ASSESSMENT — LIFESTYLE VARIABLES
HOW MANY STANDARD DRINKS CONTAINING ALCOHOL DO YOU HAVE ON A TYPICAL DAY: PATIENT DOES NOT DRINK
HOW OFTEN DO YOU HAVE A DRINK CONTAINING ALCOHOL: NEVER

## 2025-06-16 ASSESSMENT — PAIN - FUNCTIONAL ASSESSMENT: PAIN_FUNCTIONAL_ASSESSMENT: 0-10

## 2025-06-16 ASSESSMENT — PAIN SCALES - GENERAL: PAINLEVEL_OUTOF10: 5

## 2025-06-16 ASSESSMENT — PAIN DESCRIPTION - DESCRIPTORS: DESCRIPTORS: ACHING

## 2025-06-16 ASSESSMENT — PAIN DESCRIPTION - ORIENTATION: ORIENTATION: RIGHT

## 2025-06-16 NOTE — ED PROVIDER NOTES
[facility] EMERGENCY DEPARTMENT  EMERGENCY DEPARTMENT ENCOUNTER      Pt Name: Najma Camacho  MRN: [id number]  Birthdate 2000  Date of evaluation: 6/16/2025  Provider: Jadiel Moncada PA-C    CHIEF COMPLAINT       Chief Complaint   Patient presents with    Fall         HISTORY OF PRESENT ILLNESS    Patient is an 25 y.o. female with no significant past medical history who is currently 31 weeks pregnant and presents to the ER with reports of right sided pain after a fall at work. Patient reports she tripped over some palates at work and fell on her right side. Patient reports main pain is in the right humerus. Patient reports no abdominal pain, vaginal bleeding, back pain, LOC, head injury, or fluid leakage. Patient reports still feeling baby move a lot. Patient denies chest pain, shortness of breath, abdominal pain, urinary symptoms, nausea or vomiting, diarrhea or constipation, headache, dizziness, lightheadedness, fever or chills.  Patient denies alcohol use, denies smoking/vaping and admits to marijuana use.          Nursing Notes were reviewed.    REVIEW OF SYSTEMS       Review of Systems      PAST MEDICAL HISTORY   No past medical history on file.      SURGICAL HISTORY       Past Surgical History:   Procedure Laterality Date    WISDOM TOOTH EXTRACTION           CURRENT MEDICATIONS       Previous Medications    ASPIRIN 81 MG EC TABLET    Take 1 tablet by mouth daily    BLOOD GLUCOSE MONITORING SUPPL (ONE TOUCH ULTRA 2) W/DEVICE KIT    USE AS DIRECTED    INSULIN LISPRO, 1 UNIT DIAL, (HUMALOG KWIKPEN) 100 UNIT/ML SOPN    Inject 5 Units into the skin daily (with breakfast) AND 5 Units Daily with lunch AND 5 Units Daily with supper.    INSULIN NPH (HUMULIN N KWIKPEN) 100 UNIT/ML INJECTION PEN    Inject 13 Units into the skin every morning AND 13 Units nightly.    INSULIN PEN NEEDLE 32G X 6 MM MISC    Please use to administer insulin daily.    LANCETS (ONETOUCH DELICA PLUS [id number]) MISC    USE 1

## 2025-06-16 NOTE — ED TRIAGE NOTES
Pt.states tripped over some palates at work and fell on to her right side. Pt. Is 31 weeks pregnant. Happened around 0730am, States feeling the baby move a lot right now. Denies back pain, abd pain, vaginal bleeding, LOC.

## 2025-06-16 NOTE — DISCHARGE INSTRUCTIONS
Discussed visit today. You were seen in the ER today for fall on your right side. Imaging and medication for pain was offered, but declined at this time. Your US was reassuring. Please follow-up with your OBGYN so they are aware this happened. Monitor your symptoms. You can use heat to help with the pain as well.     Return to the ER with any worsening of symptoms.

## 2025-06-18 ENCOUNTER — ROUTINE PRENATAL (OUTPATIENT)
Age: 25
End: 2025-06-18
Payer: COMMERCIAL

## 2025-06-18 VITALS — DIASTOLIC BLOOD PRESSURE: 70 MMHG | SYSTOLIC BLOOD PRESSURE: 111 MMHG | HEART RATE: 86 BPM

## 2025-06-18 DIAGNOSIS — Z34.90 PREGNANCY, UNSPECIFIED GESTATIONAL AGE: ICD-10-CM

## 2025-06-18 PROCEDURE — 99213 OFFICE O/P EST LOW 20 MIN: CPT | Performed by: OBSTETRICS & GYNECOLOGY

## 2025-06-18 PROCEDURE — 76819 FETAL BIOPHYS PROFIL W/O NST: CPT | Performed by: OBSTETRICS & GYNECOLOGY

## 2025-06-18 NOTE — PROCEDURES
1d.    Patient presented today for a BPP. The biophysical profile is 8/8. There is a normal volume of amniotic fluid. The ultrasound findings were discussed with the patient.  Patient?s interval obstetrical history is reported as benign and uneventful. Patient denies any obstetrical complaints. Patient reports good fetal movements. Patient denies  regular/frequent contractions, vaginal bleeding or leakage of fluid.    (1) Pregestational DM:  - Cont ldASA for preE prophylaxis  - Recommend baseline preE labs CBC/CMP and HgbA1C is recommended once a trimester with a goal of less than 6%> 1/3/25 CBC wnl, 4/16/25 AST/ALT wnl, P:C ratio  0.1, A1C 6.2,  - Ophthalmology consult to rule out retinopathy. Reviewed this recommendation again today.  - sp fetal echo 5/2 wnl  -Growth scan on 6/12 showed accelerated fetal growth (EFW 2488 grams)  -Limited log values supplied, only for the time frame of 6/13-6/17; fasting hyperglycemia noted with fastings ranging from 103-168 mg/dl  - Current regimen: Metformin 500 mg BID, NPH 15/15, Lispro 5/5/5.  -Recommend to increase PMH NPH to 17 units    Moderate Polyhydramnios - resolved today (VANDA 23 cm)    1/19/25: CF/SMA/FXS neg ([facility])  1/7/25: hemo elec WNL  2/5/25: NIPT normal male ([facility])  3/1/25: msAFP neg    Recommendations  ==============    -Repeat growth scan x 2 weeks  -New Regimen : Metformin 500 mg BID, NPH 15/17, Lispro 5/5/5.  - Bring glucose log and food diary to [facility] appt for review  -Recommended to increase antepartum testing to 2x/week due to suspected non-compliance/poor glycemic control  - Deliver final recs TBD pending BG control (Poorly controlled 36.0-38.6)    Coding  ======    Code: 41959  Description: Fetal biophysical profile; without non-stress testing

## 2025-06-18 NOTE — PROGRESS NOTES
Patient was seen 6/18/2025      Please look under media to view full consult and ultrasound report in ViewPoint.         Abdias Dumont MD  Maternal Fetal Medicine

## 2025-06-25 ENCOUNTER — APPOINTMENT (OUTPATIENT)
Age: 25
End: 2025-06-25
Payer: COMMERCIAL

## 2025-06-25 ENCOUNTER — ROUTINE PRENATAL (OUTPATIENT)
Age: 25
End: 2025-06-25
Payer: COMMERCIAL

## 2025-06-25 VITALS — DIASTOLIC BLOOD PRESSURE: 72 MMHG | HEART RATE: 92 BPM | SYSTOLIC BLOOD PRESSURE: 115 MMHG

## 2025-06-25 DIAGNOSIS — O24.419 GESTATIONAL DIABETES MELLITUS (GDM), ANTEPARTUM, GESTATIONAL DIABETES METHOD OF CONTROL UNSPECIFIED: Primary | ICD-10-CM

## 2025-06-25 DIAGNOSIS — Z34.90 PREGNANCY, UNSPECIFIED GESTATIONAL AGE: ICD-10-CM

## 2025-06-25 PROCEDURE — 76815 OB US LIMITED FETUS(S): CPT | Performed by: STUDENT IN AN ORGANIZED HEALTH CARE EDUCATION/TRAINING PROGRAM

## 2025-06-25 PROCEDURE — 99214 OFFICE O/P EST MOD 30 MIN: CPT | Performed by: STUDENT IN AN ORGANIZED HEALTH CARE EDUCATION/TRAINING PROGRAM

## 2025-06-25 PROCEDURE — 59025 FETAL NON-STRESS TEST: CPT | Performed by: STUDENT IN AN ORGANIZED HEALTH CARE EDUCATION/TRAINING PROGRAM

## 2025-06-25 RX ORDER — BLOOD-GLUCOSE METER
EACH MISCELLANEOUS
Qty: 1 KIT | Refills: 0 | Status: SHIPPED | OUTPATIENT
Start: 2025-06-25

## 2025-06-25 NOTE — PROGRESS NOTES
Patient was seen 6/25/2025      Please look under media to view full consult and ultrasound report in ViewPoint.         Marine Crowder MD   Maternal Fetal Medicine

## 2025-06-25 NOTE — PROCEDURES
PATIENT: OWEN SCHULZ   -  : 2000   -  DOS:2025   -  INTERPRETING PROVIDER:Marine Crowder,   Indication  ========    GDM    Method  ======    External Fetal Monitor and transabdominal ultrasound examination. View: Good view    Pregnancy  =========    Kee pregnancy. Number of fetuses: 1    Dating  ======    LMP on: 2024  Cycle: regular cycle  GA by LMP 33 w + 1 d  HENRY by LMP: 2025  Previous Ultrasound on: 1/3/2025  Type of prior assessment: GA  GA at prior assessment date 8 w + 6 d  GA by previous U/S 33 w + 4 d  HENRY by previous Ultrasound: 2025  Assigned: based on the LMP, selected on 04/10/2025  Assigned GA 33 w + 1 d  Assigned HENRY: 2025    General Evaluation  ==============    Cardiac activity present.  bpm. Fetal movements: visualized. Presentation: Transverse, head to maternal left  Placenta: Placental site: anterior, appropriate distance from the internal os  Umbilical cord: Cord vessels: 3 vessel cord    Fetal Anatomy  ===========    Face  Mandible: NOT VISUALIZED  Stomach: normal  Kidneys: normal  Bladder: normal  Wants to know fetal sex: yes    Amniotic Fluid Assessment  =====================    Amount of AF: normal  MVP 6.4 cm. VANDA 18.4 cm. Q1 3.2 cm, Q2 4.1 cm, Q3 4.7 cm, Q4 6.4 cm    Biophysical Profile  ==============    2: Fetal breathing movements  2: Gross body movements  2: Fetal tone  2: Amniotic fluid volume  NST: reactive  10/10 Biophysical profile score    Non Stress Test  =============    NST interpretation: reactive. Test duration 40 min. Baseline  bpm. Baseline variability: moderate. Accelerations: present. Decelerations: absent. Uterine activity:  absent. Acoustic stimulation: no    Findings  =======    Intrauterine Kee pregnancy at 33w 1d by clinical dates.  Amniotic fluid: normal.  Placenta is anterior, appropriate distance from the internal os.  Transverse, head to maternal left presentation.  NST is reactive.  The ultrasound

## 2025-07-03 ENCOUNTER — ROUTINE PRENATAL (OUTPATIENT)
Age: 25
End: 2025-07-03
Payer: COMMERCIAL

## 2025-07-03 VITALS — DIASTOLIC BLOOD PRESSURE: 70 MMHG | HEART RATE: 82 BPM | SYSTOLIC BLOOD PRESSURE: 112 MMHG

## 2025-07-03 DIAGNOSIS — Z34.90 PREGNANCY, UNSPECIFIED GESTATIONAL AGE: ICD-10-CM

## 2025-07-03 PROCEDURE — 76818 FETAL BIOPHYS PROFILE W/NST: CPT | Performed by: STUDENT IN AN ORGANIZED HEALTH CARE EDUCATION/TRAINING PROGRAM

## 2025-07-03 PROCEDURE — 99214 OFFICE O/P EST MOD 30 MIN: CPT | Performed by: STUDENT IN AN ORGANIZED HEALTH CARE EDUCATION/TRAINING PROGRAM

## 2025-07-03 NOTE — PROCEDURES
PATIENT: OWEN SCHULZ   -  : 2000   -  DOS:2025   -  INTERPRETING PROVIDER:Marine Crowder,   Indication  ========    GDM    Method  ======    External Fetal Monitor and transabdominal ultrasound examination. View: Sufficient    Pregnancy  =========    Kee pregnancy. Number of fetuses: 1    Dating  ======    LMP on: 2024  Cycle: regular cycle  GA by LMP 34 w + 2 d  HENRY by LMP: 2025  Previous Ultrasound on: 1/3/2025  Type of prior assessment: GA  GA at prior assessment date 8 w + 6 d  GA by previous U/S 34 w + 5 d  HENRY by previous Ultrasound: 2025  Assigned: based on the LMP, selected on 04/10/2025  Assigned GA 34 w + 2 d  Assigned HENRY: 2025    General Evaluation  ==============    Cardiac activity present.  bpm. Fetal movements: visualized. Presentation: Cephalic  Placenta: Placental site: anterior, appropriate distance from the internal os  Umbilical cord: Cord vessels: 3 vessel cord    Fetal Anatomy  ===========    Face  Mandible: NOT VISUALIZED  Stomach: normal  Kidneys: normal  Bladder: normal  Wants to know fetal sex: yes    Amniotic Fluid Assessment  =====================    Amount of AF: normal  MVP 6.7 cm. VANDA 18.9 cm. Q1 6.7 cm, Q2 3.0 cm, Q3 4.1 cm, Q4 5.1 cm    Biophysical Profile  ==============    2: Fetal breathing movements  2: Gross body movements  2: Fetal tone  2: Amniotic fluid volume  NST: reactive  10/10 Biophysical profile score    Non Stress Test  =============    NST interpretation: reactive. Test duration 20 min. Baseline  bpm. Baseline variability: moderate. Accelerations: present. Decelerations: absent. Uterine activity:  absent    Findings  =======    Intrauterine Kee pregnancy at 34w 2d by clinical dates.  Amniotic fluid: normal.  Placenta is anterior, appropriate distance from the internal os.  Cephalic presentation.    Biophysical profile score is 10/10.  NST is reactive.    The ultrasound findings as listed above and

## 2025-07-03 NOTE — PROGRESS NOTES
Patient was seen 7/3/2025      Please look under media to view full consult and ultrasound report in ViewPoint.         Marine Crowder MD   Maternal Fetal Medicine

## 2025-07-07 ENCOUNTER — ROUTINE PRENATAL (OUTPATIENT)
Age: 25
End: 2025-07-07
Payer: COMMERCIAL

## 2025-07-07 VITALS — SYSTOLIC BLOOD PRESSURE: 122 MMHG | DIASTOLIC BLOOD PRESSURE: 78 MMHG | HEART RATE: 76 BPM

## 2025-07-07 DIAGNOSIS — Z34.90 PREGNANCY, UNSPECIFIED GESTATIONAL AGE: ICD-10-CM

## 2025-07-07 PROCEDURE — 59025 FETAL NON-STRESS TEST: CPT | Performed by: OBSTETRICS & GYNECOLOGY

## 2025-07-07 PROCEDURE — 76815 OB US LIMITED FETUS(S): CPT | Performed by: OBSTETRICS & GYNECOLOGY

## 2025-07-07 PROCEDURE — 99213 OFFICE O/P EST LOW 20 MIN: CPT | Performed by: OBSTETRICS & GYNECOLOGY

## 2025-07-07 NOTE — PROCEDURES
PATIENT: OWEN SCHULZ   -  : 2000   -  DOS:2025   -  INTERPRETING PROVIDER:Abdias Dumont,   Indication  ========    GDM    Method  ======    External Fetal Monitor and transabdominal ultrasound examination. View: Sufficient    Pregnancy  =========    Kee pregnancy. Number of fetuses: 1    Dating  ======    LMP on: 2024  Cycle: regular cycle  GA by LMP 34 w + 6 d  HENRY by LMP: 2025  Previous Ultrasound on: 1/3/2025  Type of prior assessment: GA  GA at prior assessment date 8 w + 6 d  GA by previous U/S 35 w + 2 d  HENRY by previous Ultrasound: 2025  Assigned: based on the LMP, selected on 04/10/2025  Assigned GA 34 w + 6 d  Assigned HENRY: 2025    General Evaluation  ==============    Cardiac activity present.  bpm. Fetal movements: visualized. Presentation: Cephalic  Placenta: Placental site: anterior, appropriate distance from the internal os  Umbilical cord: Cord vessels: 3 vessel cord    Fetal Anatomy  ===========    Face  Mandible: NOT VISUALIZED  Stomach: normal  Kidneys: normal  Bladder: normal  Wants to know fetal sex: yes    Amniotic Fluid Assessment  =====================    Amount of AF: normal  MVP 6.3 cm. VANDA 22.0 cm. Q1 5.2 cm, Q2 6.3 cm, Q3 4.7 cm, Q4 5.9 cm    Non Stress Test  =============    NST interpretation: reactive. Test duration 20 min. Baseline  bpm. Baseline variability: moderate. Accelerations: absent. Decelerations: absent. Uterine activity:  present, patient comfortable    Findings  =======    Intrauterine Kee pregnancy at 34w 6d by clinical dates.  Amniotic fluid: normal.  Placenta is anterior, appropriate distance from the internal os.  Cephalic presentation.    NST is reactive. Modified BPP is normal.    The ultrasound findings as listed above and diagnostic limitations of ultrasound imaging, including inability to exclude all anomalies, have been reviewed with the patient. All  questions and concerns

## 2025-07-07 NOTE — PROGRESS NOTES
Patient was seen 7/7/2025      Please look under media to view full consult and ultrasound report in ViewPoint.         Abdias Dumont MD  Maternal Fetal Medicine

## 2025-07-10 ENCOUNTER — ROUTINE PRENATAL (OUTPATIENT)
Age: 25
End: 2025-07-10
Payer: COMMERCIAL

## 2025-07-10 VITALS — SYSTOLIC BLOOD PRESSURE: 115 MMHG | DIASTOLIC BLOOD PRESSURE: 70 MMHG | HEART RATE: 79 BPM

## 2025-07-10 DIAGNOSIS — Z34.90 PREGNANCY, UNSPECIFIED GESTATIONAL AGE: ICD-10-CM

## 2025-07-10 LAB — GBS, EXTERNAL RESULT: NEGATIVE

## 2025-07-10 PROCEDURE — 76816 OB US FOLLOW-UP PER FETUS: CPT | Performed by: OBSTETRICS & GYNECOLOGY

## 2025-07-10 PROCEDURE — 99213 OFFICE O/P EST LOW 20 MIN: CPT | Performed by: OBSTETRICS & GYNECOLOGY

## 2025-07-10 PROCEDURE — 76819 FETAL BIOPHYS PROFIL W/O NST: CPT | Performed by: OBSTETRICS & GYNECOLOGY

## 2025-07-10 NOTE — PROGRESS NOTES
Patient was seen 7/10/2025      Please look under media to view full consult and ultrasound report in ViewPoint.         Abdias Dumont MD  Maternal Fetal Medicine

## 2025-07-10 NOTE — PROCEDURES
PATIENT: OWEN SCHULZ   -  : 2000   -  DOS:07/10/2025   -  INTERPRETING PROVIDER:Abdias Dumont,   Indication  ========    GDM (insulin)    Method  ======    Transabdominal ultrasound examination. View: suboptimal due to unfavorable fetal position    Pregnancy  =========    Kee pregnancy. Number of fetuses: 1    Dating  ======    LMP on: 2024  Cycle: regular cycle  GA by LMP 35 w + 2 d  HENRY by LMP: 2025  Previous Ultrasound on: 1/3/2025  Type of prior assessment: GA  GA at prior assessment date 8 w + 6 d  GA by previous U/S 35 w + 5 d  HENRY by previous Ultrasound: 2025  Ultrasound examination on: 7/10/2025  GA by U/S based upon: AC, BPD, Femur, HC  GA by U/S 38 w + 0 d  HENRY by U/S: 2025  Assigned: based on the LMP, selected on 04/10/2025  Assigned GA 35 w + 2 d  Assigned HENRY: 2025    Fetal Biometry  ============    Standard  BPD 92.7 mm 37w 5d 97% Hadlock  .6 mm 39w 4d 97% Allie  .6 mm 37w 4d 72% Hadlock  .0 mm 41w 5d >99% Hadlock  Femur 68.0 mm 35w 0d 34% Hadlock  EFW 3,785 g -/- >99% Hadlock  EFW (lb) 8 lb  EFW (oz) 6 oz  EFW by: Hadlock (BPD-HC-AC-FL)  Other Structures   bpm    General Evaluation  ==============    Cardiac activity present.  bpm. Fetal movements: visualized. Presentation: Cephalic  Placenta: Placental site: anterior, appropriate distance from the internal os  Umbilical cord: Cord vessels: 3 vessel cord  Amniotic fluid: Amount of AF: normal. MVP 6.1 cm. VANDA 17.6 cm. Q1 5.4 cm, Q2 6.1 cm, Q3 2.1 cm, Q4 4.0 cm    Fetal Anatomy  ===========    Face  Mandible: NOT VISUALIZED  Stomach: normal  Kidneys: normal  Bladder: normal  Wants to know fetal sex: yes    Biophysical Profile  ==============    2: Fetal breathing movements  2: Gross body movements  2: Fetal tone  2: Amniotic fluid volume  8 Biophysical profile score    Findings  =======    Intrauterine Kee pregnancy at 35w 2d by clinical dates.  EFW is 3785 g at

## 2025-07-17 ENCOUNTER — APPOINTMENT (OUTPATIENT)
Age: 25
End: 2025-07-17
Payer: COMMERCIAL

## 2025-07-17 ENCOUNTER — ROUTINE PRENATAL (OUTPATIENT)
Age: 25
End: 2025-07-17
Payer: COMMERCIAL

## 2025-07-17 VITALS — SYSTOLIC BLOOD PRESSURE: 121 MMHG | DIASTOLIC BLOOD PRESSURE: 73 MMHG | HEART RATE: 79 BPM

## 2025-07-17 DIAGNOSIS — Z3A.36 36 WEEKS GESTATION OF PREGNANCY: ICD-10-CM

## 2025-07-17 DIAGNOSIS — O24.414 INSULIN CONTROLLED GESTATIONAL DIABETES MELLITUS (GDM) IN THIRD TRIMESTER: Primary | ICD-10-CM

## 2025-07-17 PROCEDURE — 76818 FETAL BIOPHYS PROFILE W/NST: CPT | Performed by: OBSTETRICS & GYNECOLOGY

## 2025-07-17 PROCEDURE — 99024 POSTOP FOLLOW-UP VISIT: CPT | Performed by: OBSTETRICS & GYNECOLOGY

## 2025-07-17 NOTE — PROCEDURES
PATIENT: OWEN SCHULZ   -  : 2000   -  DOS:2025   -  INTERPRETING PROVIDER:Omer Ying,   Indication  ========    GDM (insulin)    Method  ======    External Fetal Monitor and transabdominal ultrasound examination. View: Sufficient    Pregnancy  =========    Kee pregnancy. Number of fetuses: 1    Dating  ======    LMP on: 2024  Cycle: regular cycle  GA by LMP 36 w + 2 d  HENRY by LMP: 2025  Previous Ultrasound on: 1/3/2025  Type of prior assessment: GA  GA at prior assessment date 8 w + 6 d  GA by previous U/S 36 w + 5 d  HENRY by previous Ultrasound: 2025  Assigned: based on the LMP, selected on 04/10/2025  Assigned GA 36 w + 2 d  Assigned HENRY: 2025    General Evaluation  ==============    Cardiac activity present.  bpm. Fetal movements: visualized. Presentation: Cephalic  Placenta: Placental site: anterior, appropriate distance from the internal os  Umbilical cord: Cord vessels: 3 vessel cord    Fetal Anatomy  ===========    Face  Mandible: NOT VISUALIZED  Stomach: normal  Kidneys: normal  Bladder: normal  Wants to know fetal sex: yes    Amniotic Fluid Assessment  =====================    Amount of AF: normal  MVP 6.9 cm. VANDA 18.2 cm. Q1 4.2 cm, Q2 2.4 cm, Q3 6.9 cm, Q4 4.7 cm    Biophysical Profile  ==============    2: Fetal breathing movements  2: Gross body movements  2: Fetal tone  2: Amniotic fluid volume  NST: reactive  10/10 Biophysical profile score    Non Stress Test  =============    NST interpretation: reactive. Test duration 20 min. Baseline  bpm. Baseline variability: moderate. Accelerations: present. Decelerations: absent. Uterine activity:  absent    Findings  =======    Intrauterine Kee pregnancy at 36w 2d by clinical dates.  Amniotic fluid: normal.  Placenta is anterior, appropriate distance from the internal os.  Cephalic presentation.    Biophysical profile score is 10/10.  NST is reactive.    I could not reach the patient and left

## 2025-07-21 ENCOUNTER — ROUTINE PRENATAL (OUTPATIENT)
Age: 25
End: 2025-07-21
Payer: COMMERCIAL

## 2025-07-21 VITALS — DIASTOLIC BLOOD PRESSURE: 73 MMHG | SYSTOLIC BLOOD PRESSURE: 125 MMHG | HEART RATE: 75 BPM

## 2025-07-21 DIAGNOSIS — O24.319 PRE-EXISTING DIABETES MELLITUS DURING PREGNANCY, ANTEPARTUM: Primary | ICD-10-CM

## 2025-07-21 DIAGNOSIS — O24.414 INSULIN CONTROLLED GESTATIONAL DIABETES MELLITUS (GDM) IN THIRD TRIMESTER: Primary | ICD-10-CM

## 2025-07-21 PROCEDURE — 76815 OB US LIMITED FETUS(S): CPT | Performed by: OBSTETRICS & GYNECOLOGY

## 2025-07-21 PROCEDURE — 59025 FETAL NON-STRESS TEST: CPT | Performed by: OBSTETRICS & GYNECOLOGY

## 2025-07-21 PROCEDURE — 99212 OFFICE O/P EST SF 10 MIN: CPT

## 2025-07-21 NOTE — PROCEDURES
PATIENT: OWEN SCHULZ   -  : 2000   -  DOS:2025   -  INTERPRETING PROVIDER:Noris Kuhn,   Indication  ========    GDMA2    Method  ======    External Fetal Monitor and transabdominal ultrasound examination. View: Sufficient    Pregnancy  =========    Kee pregnancy. Number of fetuses: 1    Dating  ======    LMP on: 2024  Cycle: regular cycle  GA by LMP 36 w + 6 d  HENRY by LMP: 2025  Previous Ultrasound on: 1/3/2025  Type of prior assessment: GA  GA at prior assessment date 8 w + 6 d  GA by previous U/S 37 w + 2 d  HENRY by previous Ultrasound: 2025  Assigned: based on the LMP, selected on 04/10/2025  Assigned GA 36 w + 6 d  Assigned HENRY: 2025    General Evaluation  ==============    Cardiac activity present.  bpm. Fetal movements: visualized. Presentation: Cephalic  Placenta: Placental site: anterior, appropriate distance from the internal os  Umbilical cord: Cord vessels: 3 vessel cord    Fetal Anatomy  ===========    Face  Mandible: NOT VISUALIZED  Stomach: normal  Kidneys: normal  Bladder: normal  Wants to know fetal sex: yes    Amniotic Fluid Assessment  =====================    Amount of AF: normal  MVP 7.5 cm. VANDA 20.7 cm. Q1 2.0 cm, Q2 7.5 cm, Q3 7.4 cm, Q4 3.8 cm    Non Stress Test  =============    NST interpretation: reactive. Test duration 20 min. Baseline  bpm. Baseline variability: moderate. Accelerations: present. Decelerations: absent. Uterine activity:  present, uterine irritability present; pt reports infrequent contractions    Findings  =======    Intrauterine Kee pregnancy at 36w 6d by clinical dates.  Amniotic fluid: normal.  Placenta is anterior, appropriate distance from the internal os.  Cephalic presentation.    NST is reactive. Modified BPP is normal.    The ultrasound findings as listed above and diagnostic limitations of ultrasound imaging, including inability to exclude all anomalies, have been reviewed with the patient.

## 2025-07-21 NOTE — PROGRESS NOTES
Assessment & Plan   ASSESSMENT/PLAN:  1. Pre-existing diabetes mellitus during pregnancy, antepartum    NAJMA is 25 yrs of age,  at 36w 6d.      Patient presented today for a Modified BPP. The modified biophysical profile is normal. The amniotic fluid volume today is normal . The ultrasound findings were discussed with the patient.      Patient’s interval obstetrical history is reported as benign and uneventful. Patient denies any obstetrical complaints. Patient reports good fetal movements. Patient denies regular/frequent contractions, vaginal bleeding or leakage of fluid.     Pregestational DM:   - Cont ldASA for preE prophylaxis   - Recommend baseline preE labs CBC/CMP and HgbA1C is recommended once a trimester with a goal of less than 6%> 1/3/25 CBC wnl, 25 AST/ALT wnl, P:C ratio 0.1, A1C 6.2,   - Ophthalmology consult to rule out retinopathy. Prev reviewed with pt and not obtained.   - sp fetal echo  wnl   - No log today : Reports she will submit via [facility]. Discussed to better assess and make recommendations I need to review her glucose log.   - Current regimen: Metformin 500 mg BID, NPH , Lispro . Denies hypoglycemic episodes. Continue current regimen.   - 7/10 EFW 3785 g at >99%, abdominal circumference at >99%     Hx Moderate Polyhydramnios - resolved      25: CF/SMA/FXS neg ([facility])  25: hemo elec WNL  25: NIPT normal male ([facility])  3/1/25: msAFP neg    Recommendations  IOL scheduled   No Maternal Fetal Medicine follow up is indicated. We will gladly see your patient as clinically indicated.     Patient images have been reviewed. Agree with the plan of care as outlined above.   Dr. Noris Kuhn MD   Maternal Fetal Medicine     Please see Viewpoint for ultrasound findings.        Subjective   Najma Camacho (:  2000) is a 25 y.o. female,Established patient, here for evaluation of the following chief complaint(s):  1. Pre-existing diabetes

## 2025-07-23 ENCOUNTER — HOSPITAL ENCOUNTER (INPATIENT)
Facility: HOSPITAL | Age: 25
LOS: 4 days | Discharge: HOME | DRG: 560 | End: 2025-07-27
Attending: STUDENT IN AN ORGANIZED HEALTH CARE EDUCATION/TRAINING PROGRAM | Admitting: OBSTETRICS & GYNECOLOGY
Payer: COMMERCIAL

## 2025-07-23 PROBLEM — Z33.1 PREGNANCY AS INCIDENTAL FINDING: Status: ACTIVE | Noted: 2025-07-23

## 2025-07-23 LAB
ABO + RH BLD: NORMAL
ALBUMIN SERPL-MCNC: 2.8 G/DL (ref 3.5–5)
ALBUMIN/GLOB SERPL: 0.9 (ref 1.1–2.2)
ALP SERPL-CCNC: 233 U/L (ref 45–117)
ALT SERPL-CCNC: 25 U/L (ref 12–78)
ANION GAP SERPL CALC-SCNC: 9 MMOL/L (ref 2–12)
AST SERPL-CCNC: 37 U/L (ref 15–37)
BASOPHILS # BLD: 0.01 K/UL (ref 0–0.1)
BASOPHILS NFR BLD: 0.1 % (ref 0–1)
BILIRUB SERPL-MCNC: 0.4 MG/DL (ref 0.2–1)
BLOOD GROUP ANTIBODIES SERPL: NORMAL
BUN SERPL-MCNC: 11 MG/DL (ref 6–20)
BUN/CREAT SERPL: 22 (ref 12–20)
CALCIUM SERPL-MCNC: 8.8 MG/DL (ref 8.5–10.1)
CHLORIDE SERPL-SCNC: 109 MMOL/L (ref 97–108)
CO2 SERPL-SCNC: 21 MMOL/L (ref 21–32)
CREAT SERPL-MCNC: 0.5 MG/DL (ref 0.55–1.02)
DIFFERENTIAL METHOD BLD: ABNORMAL
EOSINOPHIL # BLD: 0.02 K/UL (ref 0–0.4)
EOSINOPHIL NFR BLD: 0.3 % (ref 0–7)
ERYTHROCYTE [DISTWIDTH] IN BLOOD BY AUTOMATED COUNT: 14.6 % (ref 11.5–14.5)
GLOBULIN SER CALC-MCNC: 3.2 G/DL (ref 2–4)
GLUCOSE BLD STRIP.AUTO-MCNC: 179 MG/DL (ref 65–117)
GLUCOSE SERPL-MCNC: 77 MG/DL (ref 65–100)
HCT VFR BLD AUTO: 31.6 % (ref 35–47)
HGB BLD-MCNC: 10.2 G/DL (ref 11.5–16)
IMM GRANULOCYTES # BLD AUTO: 0.02 K/UL (ref 0–0.04)
IMM GRANULOCYTES NFR BLD AUTO: 0.3 % (ref 0–0.5)
LYMPHOCYTES # BLD: 2.65 K/UL (ref 0.8–3.5)
LYMPHOCYTES NFR BLD: 34.9 % (ref 12–49)
MCH RBC QN AUTO: 24.3 PG (ref 26–34)
MCHC RBC AUTO-ENTMCNC: 32.3 G/DL (ref 30–36.5)
MCV RBC AUTO: 75.2 FL (ref 80–99)
MONOCYTES # BLD: 0.51 K/UL (ref 0–1)
MONOCYTES NFR BLD: 6.7 % (ref 5–13)
NEUTS SEG # BLD: 4.39 K/UL (ref 1.8–8)
NEUTS SEG NFR BLD: 57.7 % (ref 32–75)
NRBC # BLD: 0 K/UL (ref 0–0.01)
NRBC BLD-RTO: 0 PER 100 WBC
PLATELET # BLD AUTO: 174 K/UL (ref 150–400)
PMV BLD AUTO: 11.8 FL (ref 8.9–12.9)
POTASSIUM SERPL-SCNC: 3.5 MMOL/L (ref 3.5–5.1)
PROT SERPL-MCNC: 6 G/DL (ref 6.4–8.2)
RBC # BLD AUTO: 4.2 M/UL (ref 3.8–5.2)
SERVICE CMNT-IMP: ABNORMAL
SODIUM SERPL-SCNC: 139 MMOL/L (ref 136–145)
SPECIMEN EXP DATE BLD: NORMAL
WBC # BLD AUTO: 7.6 K/UL (ref 3.6–11)

## 2025-07-23 PROCEDURE — 82962 GLUCOSE BLOOD TEST: CPT

## 2025-07-23 PROCEDURE — 6360000002 HC RX W HCPCS: Performed by: OBSTETRICS & GYNECOLOGY

## 2025-07-23 PROCEDURE — 1100000000 HC RM PRIVATE

## 2025-07-23 PROCEDURE — 36415 COLL VENOUS BLD VENIPUNCTURE: CPT

## 2025-07-23 PROCEDURE — 6370000000 HC RX 637 (ALT 250 FOR IP): Performed by: OBSTETRICS & GYNECOLOGY

## 2025-07-23 PROCEDURE — 86900 BLOOD TYPING SEROLOGIC ABO: CPT

## 2025-07-23 PROCEDURE — 7210000100 HC LABOR FEE PER 1 HR: Performed by: STUDENT IN AN ORGANIZED HEALTH CARE EDUCATION/TRAINING PROGRAM

## 2025-07-23 PROCEDURE — 85025 COMPLETE CBC W/AUTO DIFF WBC: CPT

## 2025-07-23 PROCEDURE — 86901 BLOOD TYPING SEROLOGIC RH(D): CPT

## 2025-07-23 PROCEDURE — 86850 RBC ANTIBODY SCREEN: CPT

## 2025-07-23 PROCEDURE — 80053 COMPREHEN METABOLIC PANEL: CPT

## 2025-07-23 PROCEDURE — 86780 TREPONEMA PALLIDUM: CPT

## 2025-07-23 PROCEDURE — 2500000003 HC RX 250 WO HCPCS: Performed by: OBSTETRICS & GYNECOLOGY

## 2025-07-23 RX ORDER — SODIUM CHLORIDE 0.9 % (FLUSH) 0.9 %
5-40 SYRINGE (ML) INJECTION PRN
Status: DISCONTINUED | OUTPATIENT
Start: 2025-07-23 | End: 2025-07-27 | Stop reason: HOSPADM

## 2025-07-23 RX ORDER — SODIUM CHLORIDE, SODIUM LACTATE, POTASSIUM CHLORIDE, CALCIUM CHLORIDE 600; 310; 30; 20 MG/100ML; MG/100ML; MG/100ML; MG/100ML
INJECTION, SOLUTION INTRAVENOUS CONTINUOUS
Status: DISCONTINUED | OUTPATIENT
Start: 2025-07-24 | End: 2025-07-27

## 2025-07-23 RX ORDER — TERBUTALINE SULFATE 1 MG/ML
0.25 INJECTION SUBCUTANEOUS
Status: DISCONTINUED | OUTPATIENT
Start: 2025-07-23 | End: 2025-07-27 | Stop reason: HOSPADM

## 2025-07-23 RX ORDER — LOPERAMIDE HYDROCHLORIDE 2 MG/1
2 CAPSULE ORAL PRN
Status: DISCONTINUED | OUTPATIENT
Start: 2025-07-23 | End: 2025-07-27 | Stop reason: HOSPADM

## 2025-07-23 RX ORDER — PROCHLORPERAZINE EDISYLATE 5 MG/ML
10 INJECTION INTRAMUSCULAR; INTRAVENOUS EVERY 6 HOURS PRN
Status: DISCONTINUED | OUTPATIENT
Start: 2025-07-23 | End: 2025-07-27 | Stop reason: HOSPADM

## 2025-07-23 RX ORDER — MISOPROSTOL 200 UG/1
400 TABLET ORAL PRN
Status: DISCONTINUED | OUTPATIENT
Start: 2025-07-23 | End: 2025-07-27 | Stop reason: HOSPADM

## 2025-07-23 RX ORDER — INSULIN LISPRO 100 [IU]/ML
0-4 INJECTION, SOLUTION INTRAVENOUS; SUBCUTANEOUS
Status: DISCONTINUED | OUTPATIENT
Start: 2025-07-23 | End: 2025-07-26

## 2025-07-23 RX ORDER — ONDANSETRON 2 MG/ML
4 INJECTION INTRAMUSCULAR; INTRAVENOUS EVERY 6 HOURS PRN
Status: DISCONTINUED | OUTPATIENT
Start: 2025-07-23 | End: 2025-07-27 | Stop reason: HOSPADM

## 2025-07-23 RX ORDER — SODIUM CHLORIDE, SODIUM LACTATE, POTASSIUM CHLORIDE, AND CALCIUM CHLORIDE .6; .31; .03; .02 G/100ML; G/100ML; G/100ML; G/100ML
1000 INJECTION, SOLUTION INTRAVENOUS PRN
Status: DISCONTINUED | OUTPATIENT
Start: 2025-07-23 | End: 2025-07-27 | Stop reason: HOSPADM

## 2025-07-23 RX ORDER — ONDANSETRON 4 MG/1
4 TABLET, ORALLY DISINTEGRATING ORAL EVERY 8 HOURS PRN
Status: DISCONTINUED | OUTPATIENT
Start: 2025-07-23 | End: 2025-07-27 | Stop reason: HOSPADM

## 2025-07-23 RX ORDER — SODIUM CHLORIDE, SODIUM LACTATE, POTASSIUM CHLORIDE, AND CALCIUM CHLORIDE .6; .31; .03; .02 G/100ML; G/100ML; G/100ML; G/100ML
500 INJECTION, SOLUTION INTRAVENOUS PRN
Status: DISCONTINUED | OUTPATIENT
Start: 2025-07-23 | End: 2025-07-27 | Stop reason: HOSPADM

## 2025-07-23 RX ORDER — METHYLERGONOVINE MALEATE 0.2 MG/ML
200 INJECTION INTRAVENOUS PRN
Status: DISCONTINUED | OUTPATIENT
Start: 2025-07-23 | End: 2025-07-27 | Stop reason: HOSPADM

## 2025-07-23 RX ORDER — ZOLPIDEM TARTRATE 5 MG/1
5 TABLET ORAL NIGHTLY PRN
Status: DISCONTINUED | OUTPATIENT
Start: 2025-07-23 | End: 2025-07-27 | Stop reason: HOSPADM

## 2025-07-23 RX ORDER — SODIUM CHLORIDE 9 MG/ML
INJECTION, SOLUTION INTRAVENOUS PRN
Status: DISCONTINUED | OUTPATIENT
Start: 2025-07-23 | End: 2025-07-27 | Stop reason: HOSPADM

## 2025-07-23 RX ORDER — INSULIN LISPRO 100 [IU]/ML
0-8 INJECTION, SOLUTION INTRAVENOUS; SUBCUTANEOUS
Status: DISCONTINUED | OUTPATIENT
Start: 2025-07-23 | End: 2025-07-23

## 2025-07-23 RX ORDER — CARBOPROST TROMETHAMINE 250 UG/ML
250 INJECTION, SOLUTION INTRAMUSCULAR PRN
Status: DISCONTINUED | OUTPATIENT
Start: 2025-07-23 | End: 2025-07-27 | Stop reason: HOSPADM

## 2025-07-23 RX ORDER — SODIUM CHLORIDE, SODIUM LACTATE, POTASSIUM CHLORIDE, CALCIUM CHLORIDE 600; 310; 30; 20 MG/100ML; MG/100ML; MG/100ML; MG/100ML
INJECTION, SOLUTION INTRAVENOUS CONTINUOUS
Status: DISCONTINUED | OUTPATIENT
Start: 2025-07-24 | End: 2025-07-23

## 2025-07-23 RX ORDER — SODIUM CHLORIDE 0.9 % (FLUSH) 0.9 %
5-40 SYRINGE (ML) INJECTION EVERY 12 HOURS SCHEDULED
Status: DISCONTINUED | OUTPATIENT
Start: 2025-07-23 | End: 2025-07-27

## 2025-07-23 RX ORDER — DEXTROSE MONOHYDRATE 100 MG/ML
INJECTION, SOLUTION INTRAVENOUS CONTINUOUS PRN
Status: DISCONTINUED | OUTPATIENT
Start: 2025-07-23 | End: 2025-07-27 | Stop reason: HOSPADM

## 2025-07-23 RX ORDER — HYDROMORPHONE HYDROCHLORIDE 1 MG/ML
1 INJECTION, SOLUTION INTRAMUSCULAR; INTRAVENOUS; SUBCUTANEOUS
Status: DISCONTINUED | OUTPATIENT
Start: 2025-07-23 | End: 2025-07-27 | Stop reason: HOSPADM

## 2025-07-23 RX ORDER — TRANEXAMIC ACID 10 MG/ML
1000 INJECTION, SOLUTION INTRAVENOUS
Status: DISCONTINUED | OUTPATIENT
Start: 2025-07-23 | End: 2025-07-27 | Stop reason: HOSPADM

## 2025-07-23 RX ORDER — LIDOCAINE HYDROCHLORIDE 10 MG/ML
30 INJECTION, SOLUTION EPIDURAL; INFILTRATION; INTRACAUDAL; PERINEURAL PRN
Status: DISCONTINUED | OUTPATIENT
Start: 2025-07-23 | End: 2025-07-27 | Stop reason: HOSPADM

## 2025-07-23 RX ADMIN — HYDROMORPHONE HYDROCHLORIDE 1 MG: 1 INJECTION, SOLUTION INTRAMUSCULAR; INTRAVENOUS; SUBCUTANEOUS at 22:04

## 2025-07-23 RX ADMIN — INSULIN HUMAN 12 UNITS: 100 INJECTION, SUSPENSION SUBCUTANEOUS at 21:53

## 2025-07-23 RX ADMIN — Medication 25 MCG: at 22:47

## 2025-07-23 RX ADMIN — PROCHLORPERAZINE EDISYLATE 10 MG: 5 INJECTION INTRAMUSCULAR; INTRAVENOUS at 22:04

## 2025-07-23 RX ADMIN — Medication 25 MCG: at 20:27

## 2025-07-23 RX ADMIN — INSULIN LISPRO 3 UNITS: 100 INJECTION, SOLUTION INTRAVENOUS; SUBCUTANEOUS at 21:54

## 2025-07-23 RX ADMIN — SODIUM CHLORIDE, PRESERVATIVE FREE 10 ML: 5 INJECTION INTRAVENOUS at 22:15

## 2025-07-23 ASSESSMENT — PAIN DESCRIPTION - LOCATION: LOCATION: ABDOMEN

## 2025-07-23 ASSESSMENT — PAIN DESCRIPTION - DESCRIPTORS: DESCRIPTORS: CRAMPING

## 2025-07-23 ASSESSMENT — PAIN SCALES - GENERAL: PAINLEVEL_OUTOF10: 8

## 2025-07-23 NOTE — H&P
History & Physical    Name: Najma Camacho MRN: [id number]  SSN: [id number]    YOB: 2000  Age: 25 y.o.  Sex: female        Subjective:     Estimated Date of Delivery: 25  OB History          1    Para        Term                AB        Living             SAB        IAB        Ectopic        Molar        Multiple        Live Births                    Ms. Camacho is admitted with pregnancy at 37w1d for induction of labor. Prenatal course was complicated by diabetes - Type 2. Please see prenatal records for details.  EFW 7/10 3785 >99%ile, AC >99%.      Past Medical History:   Diagnosis Date    Diabetes mellitus (HCC)      Past Surgical History:   Procedure Laterality Date    WISDOM TOOTH EXTRACTION       Social History     Occupational History    Not on file   Tobacco Use    Smoking status: Never    Smokeless tobacco: Never   Vaping Use    Vaping status: Former   Substance and Sexual Activity    Alcohol use: Never     Comment: social    Drug use: Never     Types: Marijuana (Weed)    Sexual activity: Not Currently     Partners: Male     No family history on file.    No Known Allergies  Prior to Admission medications    Medication Sig Start Date End Date Taking? Authorizing Provider   Blood Glucose Monitoring Suppl (ONE TOUCH ULTRA 2) w/Device KIT Please use glucometer preferred by the pt.'s ins. 25  Yes Marine Crowder MD   insulin lispro, 1 Unit Dial, (HUMALOG KWIKPEN) 100 UNIT/ML SOPN Inject 5 Units into the skin daily (with breakfast) AND 5 Units Daily with lunch AND 5 Units Daily with supper.  Patient taking differently: Inject 5 Units into the skin daily (with breakfast) AND 7 Units Daily with lunch AND 5 Units Daily with supper. 25  Yes Marine Crowder MD   insulin NPH (HUMULIN N KWIKPEN) 100 UNIT/ML injection pen Inject 13 Units into the skin every morning AND 13 Units nightly.  Patient taking differently: NPH 25  Yes Fátima Mcknight, KELLY - CNP

## 2025-07-23 NOTE — PROGRESS NOTES
1634 Pt arrived to unit for IOL. Pt admits to having occasional ctxs. Pt denies HA, N/V, visual disturbances, LOF, or bleeding. +FM    1908 MD Ernie at bedside rounding on pt. FB 60/60

## 2025-07-24 ENCOUNTER — ANESTHESIA (OUTPATIENT)
Facility: HOSPITAL | Age: 25
End: 2025-07-24
Payer: COMMERCIAL

## 2025-07-24 ENCOUNTER — ANESTHESIA EVENT (OUTPATIENT)
Facility: HOSPITAL | Age: 25
End: 2025-07-24
Payer: COMMERCIAL

## 2025-07-24 LAB
GLUCOSE BLD STRIP.AUTO-MCNC: 125 MG/DL (ref 65–117)
GLUCOSE BLD STRIP.AUTO-MCNC: 129 MG/DL (ref 65–117)
GLUCOSE BLD STRIP.AUTO-MCNC: 130 MG/DL (ref 65–117)
GLUCOSE BLD STRIP.AUTO-MCNC: 132 MG/DL (ref 65–117)
GLUCOSE BLD STRIP.AUTO-MCNC: 87 MG/DL (ref 65–117)
SERVICE CMNT-IMP: ABNORMAL
SERVICE CMNT-IMP: NORMAL

## 2025-07-24 PROCEDURE — 2580000003 HC RX 258: Performed by: OBSTETRICS & GYNECOLOGY

## 2025-07-24 PROCEDURE — 51702 INSERT TEMP BLADDER CATH: CPT

## 2025-07-24 PROCEDURE — 7210000100 HC LABOR FEE PER 1 HR: Performed by: STUDENT IN AN ORGANIZED HEALTH CARE EDUCATION/TRAINING PROGRAM

## 2025-07-24 PROCEDURE — 82962 GLUCOSE BLOOD TEST: CPT

## 2025-07-24 PROCEDURE — 6370000000 HC RX 637 (ALT 250 FOR IP): Performed by: OBSTETRICS & GYNECOLOGY

## 2025-07-24 PROCEDURE — 1100000000 HC RM PRIVATE

## 2025-07-24 PROCEDURE — 3700000025 EPIDURAL BLOCK: Performed by: ANESTHESIOLOGY

## 2025-07-24 PROCEDURE — 94761 N-INVAS EAR/PLS OXIMETRY MLT: CPT

## 2025-07-24 PROCEDURE — 6370000000 HC RX 637 (ALT 250 FOR IP): Performed by: ADVANCED PRACTICE MIDWIFE

## 2025-07-24 PROCEDURE — 6360000002 HC RX W HCPCS: Performed by: NURSE ANESTHETIST, CERTIFIED REGISTERED

## 2025-07-24 PROCEDURE — 2500000003 HC RX 250 WO HCPCS: Performed by: NURSE ANESTHETIST, CERTIFIED REGISTERED

## 2025-07-24 PROCEDURE — 6360000002 HC RX W HCPCS: Performed by: OBSTETRICS & GYNECOLOGY

## 2025-07-24 RX ORDER — FENTANYL/BUPIVACAINE/NS/PF 2-1250MCG
10 PLASTIC BAG, INJECTION (ML) INJECTION CONTINUOUS
Refills: 0 | Status: DISCONTINUED | OUTPATIENT
Start: 2025-07-24 | End: 2025-07-27

## 2025-07-24 RX ORDER — BUPIVACAINE HYDROCHLORIDE 2.5 MG/ML
INJECTION, SOLUTION EPIDURAL; INFILTRATION; INTRACAUDAL; PERINEURAL
Status: DISCONTINUED | OUTPATIENT
Start: 2025-07-24 | End: 2025-07-25 | Stop reason: SDUPTHER

## 2025-07-24 RX ORDER — FENTANYL/BUPIVACAINE/NS/PF 2-1250MCG
10 PLASTIC BAG, INJECTION (ML) INJECTION CONTINUOUS
Refills: 0 | Status: DISCONTINUED | OUTPATIENT
Start: 2025-07-24 | End: 2025-07-24

## 2025-07-24 RX ORDER — EPHEDRINE SULFATE/0.9% NACL/PF 25 MG/5 ML
10 SYRINGE (ML) INTRAVENOUS
Status: DISCONTINUED | OUTPATIENT
Start: 2025-07-24 | End: 2025-07-27

## 2025-07-24 RX ORDER — LIDOCAINE HYDROCHLORIDE AND EPINEPHRINE 20; 5 MG/ML; UG/ML
INJECTION, SOLUTION EPIDURAL; INFILTRATION; INTRACAUDAL; PERINEURAL
Status: DISCONTINUED | OUTPATIENT
Start: 2025-07-24 | End: 2025-07-25 | Stop reason: SDUPTHER

## 2025-07-24 RX ORDER — EPHEDRINE SULFATE/0.9% NACL/PF 25 MG/5 ML
10 SYRINGE (ML) INTRAVENOUS
Status: DISCONTINUED | OUTPATIENT
Start: 2025-07-24 | End: 2025-07-27 | Stop reason: HOSPADM

## 2025-07-24 RX ORDER — INSULIN LISPRO 100 [IU]/ML
5 INJECTION, SOLUTION INTRAVENOUS; SUBCUTANEOUS ONCE
Status: DISCONTINUED | OUTPATIENT
Start: 2025-07-24 | End: 2025-07-26

## 2025-07-24 RX ADMIN — SODIUM CHLORIDE, SODIUM LACTATE, POTASSIUM CHLORIDE, AND CALCIUM CHLORIDE: .6; .31; .03; .02 INJECTION, SOLUTION INTRAVENOUS at 04:59

## 2025-07-24 RX ADMIN — Medication 10 ML/HR: at 21:03

## 2025-07-24 RX ADMIN — LIDOCAINE HYDROCHLORIDE,EPINEPHRINE BITARTRATE 3 ML: 20; .005 INJECTION, SOLUTION EPIDURAL; INFILTRATION; INTRACAUDAL; PERINEURAL at 20:21

## 2025-07-24 RX ADMIN — OXYTOCIN 2 MILLI-UNITS/MIN: 10 INJECTION, SOLUTION INTRAMUSCULAR; INTRAVENOUS at 05:00

## 2025-07-24 RX ADMIN — Medication 50 MCG: at 23:51

## 2025-07-24 RX ADMIN — Medication 25 MCG: at 00:38

## 2025-07-24 RX ADMIN — BUPIVACAINE HYDROCHLORIDE 10 ML: 2.5 INJECTION, SOLUTION EPIDURAL; INFILTRATION; INTRACAUDAL; PERINEURAL at 20:28

## 2025-07-24 RX ADMIN — ONDANSETRON 4 MG: 4 TABLET, ORALLY DISINTEGRATING ORAL at 21:51

## 2025-07-24 RX ADMIN — SODIUM CHLORIDE, SODIUM LACTATE, POTASSIUM CHLORIDE, AND CALCIUM CHLORIDE: .6; .31; .03; .02 INJECTION, SOLUTION INTRAVENOUS at 02:51

## 2025-07-24 RX ADMIN — INSULIN LISPRO 1 UNITS: 100 INJECTION, SOLUTION INTRAVENOUS; SUBCUTANEOUS at 20:59

## 2025-07-24 RX ADMIN — INSULIN LISPRO 1 UNITS: 100 INJECTION, SOLUTION INTRAVENOUS; SUBCUTANEOUS at 11:55

## 2025-07-24 NOTE — PROGRESS NOTES
Labor Note    Najma Camacho  [id number]  2000   37w2d      S:  Feeling cramping with pitocin    O:    /60   Pulse 70   Temp 97.6 °F (36.4 °C) (Oral)   Resp 16   Ht 1.6 m (5' 3\")   Wt 88.5 kg (195 lb)   LMP 2024   SpO2 99%   BMI 34.54 kg/m²        No data found.  SVE 2-3/50/-3, anterior and soft  FHT reviewed, cat I       A/P:  25 y.o.  @ 37w2d - IOL for GDMA2 -- insulin dependent unchanged. Offered AROM but unable to tolerate due to discomfort. Will get PCEA then retry at next exam.     -  GDMA2 -- q4 hr BG checks, continue SSI   - S/p cook catheter, continue pitocin, AROM next check   - Extensively counseled in the office/on L&D for shoulder dystocia and wishes to proceed  - Hemorrhage risk: moderate-high  - VTE baseline   - Anticipate         Imani Melo MD  [facility]

## 2025-07-24 NOTE — PROGRESS NOTES
0500: Assumed care of patient at this time. Patient denies any HA, RUQ pain, N//V, vision changes, vaginal bleeding, or LOF. Patient endorses positive fetal movement.     0530: Ramirez GALAN at bedside. Patient consents to SVE, patient fingertip/-3. Plan to continue with Oxytocin at this time and reassess later.

## 2025-07-24 NOTE — PROGRESS NOTES
1900 Bedside and Verbal shift change report given to PHIL Ferrer RN (oncoming nurse) by NEGRITA Valerio RN (offgoing nurse). Report included the following information Nurse Handoff Report, Index, Adult Overview, Surgery Report, Intake/Output, MAR, and Recent Results.      2016 Time out for epidural.    2021 Test dose for epidural.     2028 Bolus dose given by HERMILO Maurer CRNA for epidural.     2243 PHIL Ferrer RN at bedside with NEGRITA Camargo CNM, ultrasounding Pt. At this time to see position of baby. NEGRITA Camargo CNM SVE 4/60/-3.     0355 NEGRITA Camargo CNM and PHIL Ferrer RN at bedside, NEGRITA Camargo CNM SVE 5/70/-2 at this time. AROM, clear moderate fluid. IUPC placed.    0700 Bedside and Verbal shift change report given to NEGRITA Valerio RN (oncoming nurse) by PHIL Ferrer RN (offgoing nurse). Report included the following information Nurse Handoff Report, Index, Adult Overview, Surgery Report, Intake/Output, MAR, and Recent Results.

## 2025-07-24 NOTE — PROGRESS NOTES
1915 - Verbal shift change report given to RUSSELL Vaz RN (oncoming nurse) by ELLI High RN (offgoing nurse). Report included the following information Nurse Handoff Report, Index, Intake/Output, MAR, and Recent Results.      This RN orienting NOE Gauthier RN and reviewing all documentation.    1951 - TORB from Dr. Guzmán to 20 cc more in uterine balloon of Cook catheter. RN doing so at this time. Cook catheter now has 80U/60V.    0300 - Cook catheter out at this time.

## 2025-07-24 NOTE — PROGRESS NOTES
Ob Hopitalist    I introduced myself to the patient and she gave verbal consent for SVE. She currently denies feeling contractions.    Vitals:    25 1704 25 1710 25 0040   BP:   130/71 118/62   Pulse:   69 67   Resp:  18 18 17   Temp:  97.7 °F (36.5 °C) 97.9 °F (36.6 °C) 98.1 °F (36.7 °C)   TempSrc:  Oral Oral Oral   SpO2:  100% 99% 99%   Weight: 88.5 kg (195 lb)      Height: 1.6 m (5' 3\")      POC glc- 2 hr pp dinner 179, fasting 129, random 77  FHR: 140 moderate variability, accelerations present, cat 1  [place]: contractions q 1-3 minutes, pitocin at 2 mu  SVE: external os tight 1 cm, unable to reach internal os/50/-3    Ass/Plan:  at 37 2/7 wks IOL for type 2 DM and LFA fetus in 99 percentile, GBS negative, cat 1 fetal tracing  Cook catheter removed with gentle traction at 0300 with pitocin started at 0500. Received three doses of cytotec.  Minimal cervical change noted on exam.  -continue pitocin for now  -informed that she may need another cook catheter   -ADA diet for breakfast with 5 units of lispro  -SSI ordered for early and active phase of labor  -She was counseled regarding increased risk for shoulder dystocia in light of type 2 DM and LGA baby and that she would not be a candidate for operative vaginal delivery. Informed that shoulder dystocia is when the baby's head delivers but the anterior shoulder becomes impacted behind the pubic symphysis. Informed that this is an obstetrical emergency that requires additional maneuvers in an effort to deliver the baby and can result in transient or permanent injury to the nerves that innervate the affected arm. If this occurs the baby would be unable to move part or all the affected arm. Also made aware that a prolonged shoulder dystocia can result in a decrease in oxygen supply to the baby which can result in transient or permanent brain injury or demise of the baby.

## 2025-07-24 NOTE — PROGRESS NOTES
Labor Progress Note  Patient seen, fetal heart rate and contraction pattern evaluated at 1400. Patient says CTXs are manageable at this time. Not ready for epidural. Also not wanting to proceed with AROM at this current time.     Physical Exam:  Vitals:   Vitals:    07/23/25 1710 07/2000 07/24/25 0040 07/24/25 0724   BP:  130/71 118/62 111/60   Pulse:  69 67 70   Resp: 18 18 17 16   Temp: 97.7 °F (36.5 °C) 97.9 °F (36.6 °C) 98.1 °F (36.7 °C) 97.6 °F (36.4 °C)   TempSrc: Oral Oral Oral Oral   SpO2: 100% 99% 99%    Weight:       Height:             Cervical Exam was not examined       Uterine Activity:: Frequency: Every 2-4 minutes  Fetal Heart Rate: Reactive  Baseline: 140 per minute  Variability: moderate  Accelerations: yes  Decelerations: none  Pitocin: 14mu/min     Assessment/Plan:  Ms. Camacho is admitted with pregnancy at 37w2d undergoing IOL for GDMA2 -insulin dependent     Patient declines AROM at this time   Wants to continue on with pitocin  Reassess in 2-4hrs   May have epidural when desired     Freya Merchant DO  7/24/2025  2:57 PM    ADDENDUM  1700    Patient re-evaluated, still reports mild CTXs. Cervix 4-5/50/-3. Patient with significant pain during check. Wants to get epidural in next 1-2hrs and then AROM. Discussed importance of actively managing her IOL and trying to move progress forward to decrease complications such as infection and hemorrhage. She verbalized understanding     Freya Merchant DO

## 2025-07-24 NOTE — PROGRESS NOTES
0700  Bedside and Verbal shift change report given to ANNIE Fuller  (oncoming nurse) by NOE Andrews  (offgoing nurse). Report included the following information Adult Overview, Intake/Output, MAR, Recent Results, and Med Rec Status.    0832  MD ANNIE Melo at bedside, SVE 2-3/50/-3. See MD note   1300  MD NEGRITA Melo at bedside, SVE 2-3/50/-3. See MD note   1700  MD Merchant at bedside, SVE 4-5/50/-3

## 2025-07-25 PROBLEM — Z33.1 PREGNANCY AS INCIDENTAL FINDING: Status: RESOLVED | Noted: 2025-07-23 | Resolved: 2025-07-25

## 2025-07-25 LAB
GLUCOSE BLD STRIP.AUTO-MCNC: 100 MG/DL (ref 65–117)
GLUCOSE BLD STRIP.AUTO-MCNC: 234 MG/DL (ref 65–117)
GLUCOSE BLD STRIP.AUTO-MCNC: 90 MG/DL (ref 65–117)
SERVICE CMNT-IMP: ABNORMAL
SERVICE CMNT-IMP: NORMAL
SERVICE CMNT-IMP: NORMAL
T PALLIDUM AB SER QL AGGL: NON REACTIVE

## 2025-07-25 PROCEDURE — 6370000000 HC RX 637 (ALT 250 FOR IP): Performed by: STUDENT IN AN ORGANIZED HEALTH CARE EDUCATION/TRAINING PROGRAM

## 2025-07-25 PROCEDURE — 7210000100 HC LABOR FEE PER 1 HR: Performed by: STUDENT IN AN ORGANIZED HEALTH CARE EDUCATION/TRAINING PROGRAM

## 2025-07-25 PROCEDURE — 2500000003 HC RX 250 WO HCPCS: Performed by: NURSE ANESTHETIST, CERTIFIED REGISTERED

## 2025-07-25 PROCEDURE — 2500000003 HC RX 250 WO HCPCS: Performed by: STUDENT IN AN ORGANIZED HEALTH CARE EDUCATION/TRAINING PROGRAM

## 2025-07-25 PROCEDURE — 2580000003 HC RX 258: Performed by: ADVANCED PRACTICE MIDWIFE

## 2025-07-25 PROCEDURE — 6370000000 HC RX 637 (ALT 250 FOR IP): Performed by: ADVANCED PRACTICE MIDWIFE

## 2025-07-25 PROCEDURE — 6360000002 HC RX W HCPCS: Performed by: ADVANCED PRACTICE MIDWIFE

## 2025-07-25 PROCEDURE — 82962 GLUCOSE BLOOD TEST: CPT

## 2025-07-25 PROCEDURE — 2580000003 HC RX 258: Performed by: OBSTETRICS & GYNECOLOGY

## 2025-07-25 PROCEDURE — 10907ZC DRAINAGE OF AMNIOTIC FLUID, THERAPEUTIC FROM PRODUCTS OF CONCEPTION, VIA NATURAL OR ARTIFICIAL OPENING: ICD-10-PCS | Performed by: STUDENT IN AN ORGANIZED HEALTH CARE EDUCATION/TRAINING PROGRAM

## 2025-07-25 PROCEDURE — 6360000002 HC RX W HCPCS: Performed by: OBSTETRICS & GYNECOLOGY

## 2025-07-25 PROCEDURE — 0HQ9XZZ REPAIR PERINEUM SKIN, EXTERNAL APPROACH: ICD-10-PCS | Performed by: STUDENT IN AN ORGANIZED HEALTH CARE EDUCATION/TRAINING PROGRAM

## 2025-07-25 PROCEDURE — 6370000000 HC RX 637 (ALT 250 FOR IP): Performed by: OBSTETRICS & GYNECOLOGY

## 2025-07-25 PROCEDURE — 94761 N-INVAS EAR/PLS OXIMETRY MLT: CPT

## 2025-07-25 PROCEDURE — 6360000002 HC RX W HCPCS: Performed by: NURSE ANESTHETIST, CERTIFIED REGISTERED

## 2025-07-25 PROCEDURE — 7220000101 HC DELIVERY VAGINAL/SINGLE: Performed by: STUDENT IN AN ORGANIZED HEALTH CARE EDUCATION/TRAINING PROGRAM

## 2025-07-25 PROCEDURE — 1120000000 HC RM PRIVATE OB

## 2025-07-25 RX ORDER — INSULIN LISPRO 100 [IU]/ML
6 INJECTION, SOLUTION INTRAVENOUS; SUBCUTANEOUS ONCE
Status: COMPLETED | OUTPATIENT
Start: 2025-07-25 | End: 2025-07-25

## 2025-07-25 RX ORDER — SODIUM CHLORIDE 0.9 % (FLUSH) 0.9 %
SYRINGE (ML) INJECTION
Status: DISCONTINUED | OUTPATIENT
Start: 2025-07-25 | End: 2025-07-25 | Stop reason: SDUPTHER

## 2025-07-25 RX ORDER — ACETAMINOPHEN 500 MG
1000 TABLET ORAL EVERY 8 HOURS SCHEDULED
Status: DISCONTINUED | OUTPATIENT
Start: 2025-07-25 | End: 2025-07-25

## 2025-07-25 RX ORDER — ONDANSETRON 2 MG/ML
4 INJECTION INTRAMUSCULAR; INTRAVENOUS EVERY 6 HOURS PRN
Status: DISCONTINUED | OUTPATIENT
Start: 2025-07-25 | End: 2025-07-27 | Stop reason: HOSPADM

## 2025-07-25 RX ORDER — SODIUM CHLORIDE 0.9 % (FLUSH) 0.9 %
5-40 SYRINGE (ML) INJECTION PRN
Status: DISCONTINUED | OUTPATIENT
Start: 2025-07-25 | End: 2025-07-27 | Stop reason: HOSPADM

## 2025-07-25 RX ORDER — POLYETHYLENE GLYCOL 3350 17 G/17G
17 POWDER, FOR SOLUTION ORAL DAILY
Status: DISCONTINUED | OUTPATIENT
Start: 2025-07-25 | End: 2025-07-27 | Stop reason: HOSPADM

## 2025-07-25 RX ORDER — LOPERAMIDE HYDROCHLORIDE 2 MG/1
2 CAPSULE ORAL PRN
Status: DISCONTINUED | OUTPATIENT
Start: 2025-07-25 | End: 2025-07-27 | Stop reason: HOSPADM

## 2025-07-25 RX ORDER — FAMOTIDINE 20 MG/1
20 TABLET, FILM COATED ORAL 2 TIMES DAILY
Status: DISCONTINUED | OUTPATIENT
Start: 2025-07-25 | End: 2025-07-27 | Stop reason: HOSPADM

## 2025-07-25 RX ORDER — SIMETHICONE 80 MG
80 TABLET,CHEWABLE ORAL EVERY 6 HOURS PRN
Status: DISCONTINUED | OUTPATIENT
Start: 2025-07-25 | End: 2025-07-27 | Stop reason: HOSPADM

## 2025-07-25 RX ORDER — ONDANSETRON 4 MG/1
4 TABLET, ORALLY DISINTEGRATING ORAL EVERY 6 HOURS PRN
Status: DISCONTINUED | OUTPATIENT
Start: 2025-07-25 | End: 2025-07-27 | Stop reason: HOSPADM

## 2025-07-25 RX ORDER — SODIUM CHLORIDE 0.9 % (FLUSH) 0.9 %
5-40 SYRINGE (ML) INJECTION EVERY 12 HOURS SCHEDULED
Status: DISCONTINUED | OUTPATIENT
Start: 2025-07-25 | End: 2025-07-27

## 2025-07-25 RX ORDER — SODIUM CHLORIDE 9 MG/ML
INJECTION, SOLUTION INTRAVENOUS PRN
Status: DISCONTINUED | OUTPATIENT
Start: 2025-07-25 | End: 2025-07-27 | Stop reason: HOSPADM

## 2025-07-25 RX ORDER — SODIUM CHLORIDE, SODIUM LACTATE, POTASSIUM CHLORIDE, CALCIUM CHLORIDE 600; 310; 30; 20 MG/100ML; MG/100ML; MG/100ML; MG/100ML
INJECTION, SOLUTION INTRAVENOUS CONTINUOUS
Status: DISCONTINUED | OUTPATIENT
Start: 2025-07-25 | End: 2025-07-27

## 2025-07-25 RX ORDER — ACETAMINOPHEN 500 MG
1000 TABLET ORAL EVERY 8 HOURS SCHEDULED
Status: DISCONTINUED | OUTPATIENT
Start: 2025-07-25 | End: 2025-07-27 | Stop reason: HOSPADM

## 2025-07-25 RX ORDER — DOCUSATE SODIUM 100 MG/1
100 CAPSULE, LIQUID FILLED ORAL 2 TIMES DAILY
Status: DISCONTINUED | OUTPATIENT
Start: 2025-07-25 | End: 2025-07-27 | Stop reason: HOSPADM

## 2025-07-25 RX ORDER — IBUPROFEN 800 MG/1
800 TABLET, FILM COATED ORAL EVERY 8 HOURS SCHEDULED
Status: DISCONTINUED | OUTPATIENT
Start: 2025-07-25 | End: 2025-07-27 | Stop reason: HOSPADM

## 2025-07-25 RX ADMIN — INSULIN LISPRO 6 UNITS: 100 INJECTION, SOLUTION INTRAVENOUS; SUBCUTANEOUS at 21:53

## 2025-07-25 RX ADMIN — Medication 10 ML/HR: at 13:32

## 2025-07-25 RX ADMIN — SODIUM CHLORIDE, PRESERVATIVE FREE 2.5 ML: 5 INJECTION INTRAVENOUS at 09:22

## 2025-07-25 RX ADMIN — HYDROMORPHONE HYDROCHLORIDE 1 MG: 1 INJECTION, SOLUTION INTRAMUSCULAR; INTRAVENOUS; SUBCUTANEOUS at 21:53

## 2025-07-25 RX ADMIN — Medication 50 MCG: at 01:56

## 2025-07-25 RX ADMIN — Medication 10 ML/HR: at 05:52

## 2025-07-25 RX ADMIN — IBUPROFEN 800 MG: 800 TABLET, FILM COATED ORAL at 21:07

## 2025-07-25 RX ADMIN — BUPIVACAINE HYDROCHLORIDE 2.5 ML: 2.5 INJECTION, SOLUTION EPIDURAL; INFILTRATION; INTRACAUDAL; PERINEURAL at 09:22

## 2025-07-25 RX ADMIN — DOCUSATE SODIUM 100 MG: 100 CAPSULE, LIQUID FILLED ORAL at 21:02

## 2025-07-25 RX ADMIN — SODIUM CHLORIDE, SODIUM LACTATE, POTASSIUM CHLORIDE, AND CALCIUM CHLORIDE: .6; .31; .03; .02 INJECTION, SOLUTION INTRAVENOUS at 09:31

## 2025-07-25 RX ADMIN — OXYTOCIN 2 MILLI-UNITS/MIN: 10 INJECTION, SOLUTION INTRAMUSCULAR; INTRAVENOUS at 05:57

## 2025-07-25 RX ADMIN — ACETAMINOPHEN 1000 MG: 500 TABLET ORAL at 17:36

## 2025-07-25 RX ADMIN — SODIUM CHLORIDE, PRESERVATIVE FREE 10 ML: 5 INJECTION INTRAVENOUS at 21:02

## 2025-07-25 ASSESSMENT — PAIN DESCRIPTION - DESCRIPTORS
DESCRIPTORS: SORE
DESCRIPTORS: ACHING;CRAMPING;SORE

## 2025-07-25 ASSESSMENT — PAIN SCALES - GENERAL
PAINLEVEL_OUTOF10: 10
PAINLEVEL_OUTOF10: 8
PAINLEVEL_OUTOF10: 6

## 2025-07-25 ASSESSMENT — PAIN DESCRIPTION - ORIENTATION
ORIENTATION: RIGHT
ORIENTATION: ANTERIOR;LOWER;LEFT
ORIENTATION: ANTERIOR

## 2025-07-25 ASSESSMENT — PAIN DESCRIPTION - LOCATION
LOCATION: HEAD
LOCATION: ABDOMEN;BACK;LEG
LOCATION: LEG

## 2025-07-25 NOTE — PROGRESS NOTES
CNM Labor Progress Note     Patient: Najma Camacho MRN: [id number]  SSN: [id number]    YOB: 2000  Age: 25 y.o.  Sex: female        Subjective:   Patient coping well with contractions. Pt denies having any pain since having her epidural.       Objective:   Blood pressure (!) 111/56, pulse 82, temperature 98.1 °F (36.7 °C), temperature source Oral, resp. rate 16, height 1.6 m (5' 3\"), weight 88.5 kg (195 lb), last menstrual period 2024, SpO2 99%.    Fetal heart baseline 145, moderate variability, accelerations present, decelerations not present, Uterine contractions q 4-6 minutes, moderate  to palpation, resting tone soft.    Sterile Vaginal Exam: 4.5 cm dilated/ 70% effaced/ -2 station, soft, cervix midline , fetal presentation vertex, membranes intact.      Assessment:     37w3d  Category 1 fetal heart rate tracing   Labor   GBS: Negative   Type 2 DM  LGA : EFW 10 3785 >99%ile, AC >99%   Maternal BMI 35   AROM - at 03:56 am on 2025 , clear fluid present.    Plan:   Continue current orders/management.    AROM is performed with pt's consent. Clear fluid present.     IV pitocin to be started 4 hors post last cytotec. IV pit start at 05:45 am.     CNM management     Anticipate     KELLY Vick - [facility]

## 2025-07-25 NOTE — PROGRESS NOTES
Labor Note    Najma Camacho  [id number]  2000   37w3d      S:  Feeling pressure    O:    /60   Pulse 62   Temp 98 °F (36.7 °C) (Temporal)   Resp 16   Ht 1.6 m (5' 3\")   Wt 88.5 kg (195 lb)   LMP 2024   SpO2 99%   BMI 34.54 kg/m²        No data found.    GEN NAD   RESP Nonlabored, symmetric chest rise  SVE //-1  EXTREM Symmetric lower extremities     /mod/+a/nod   [place] 2 min      A/P:  25 y.o.  @ 37w3d - IOL  T2DM; LGA >99th%ile ~8.3 lb 7/10   - GBS neg / Rhpos  - FWB:  CEFM/[place]  - Labor course s/p balloon/cytotec --> pit --> cytotec --> pit; AROM at 4 am TODAY , pit at 6, starting to feel pressure, IUPC in   - Pain control - PCEA  - Anticipate .      Franklyn Rock MD  [facility]

## 2025-07-25 NOTE — ANESTHESIA PRE PROCEDURE
Department of Anesthesiology  Preprocedure Note       Name:  Najma Camacho   Age:  25 y.o.  :  2000                                          MRN:  [id number]         Date:  2025      Surgeon: * No surgeons listed *    Procedure: * No procedures listed *    Medications prior to admission:   Prior to Admission medications    Medication Sig Start Date End Date Taking? Authorizing Provider   Blood Glucose Monitoring Suppl (ONE TOUCH ULTRA 2) w/Device KIT Please use glucometer preferred by the pt.'s ins. 25  Yes Marine Crowder MD   insulin lispro, 1 Unit Dial, (HUMALOG KWIKPEN) 100 UNIT/ML SOPN Inject 5 Units into the skin daily (with breakfast) AND 5 Units Daily with lunch AND 5 Units Daily with supper.  Patient taking differently: Inject 5 Units into the skin daily (with breakfast) AND 7 Units Daily with lunch AND 5 Units Daily with supper. 25  Yes Marine Crowder MD   insulin NPH (HUMULIN N KWIKPEN) 100 UNIT/ML injection pen Inject 13 Units into the skin every morning AND 13 Units nightly.  Patient taking differently: NPH 25  Yes Fátima Mcknight APRN - CNP   aspirin 81 MG EC tablet Take 1 tablet by mouth daily   Yes Everette Garnica MD   Insulin Pen Needle 32G X 6 MM MISC Please use to administer insulin daily. 25  Yes Fátima Mcknight APRN - CNP   Prenatal MV-Min-Fe Fum-FA-DHA (PRENATAL 1 PO) Take by mouth   Yes ProviderEverette MD   metFORMIN (GLUCOPHAGE) 500 MG tablet Take by mouth 2 times daily 3/13/25  Yes Everette Garnica MD   Lancets (ONETOUCH DELICA PLUS [id number]) MISC  25  Yes Everette Garnica MD   ONETOUCH ULTRA TEST strip  25  Yes Everette Garnica MD       Current medications:    Current Facility-Administered Medications   Medication Dose Route Frequency Provider Last Rate Last Admin   • insulin lispro (HUMALOG,ADMELOG) injection vial 5 Units  5 Units SubCUTAneous Once Robert Guzmán MD       • fentaNYL 2 mcg/mL

## 2025-07-25 NOTE — PROGRESS NOTES
Labor Note    Najma Camacho  [id number]  2000   37w3d      S:  Feeling fine w pcea     O:    /60   Pulse 62   Temp 97.8 °F (36.6 °C) (Temporal)   Resp 16   Ht 1.6 m (5' 3\")   Wt 88.5 kg (195 lb)   LMP 2024   SpO2 99%   BMI 34.54 kg/m²        No data found.    GEN NAD   RESP Nonlabored, symmetric chest rise  SVE 9/c/0, ant lip reduced --> 10/c/0   EXTREM Symmetric lower extremities     FHT cat 1, previously cat 2   [place] a2     A/P:  25 y.o.  @ 37w3d - IOL T2DM w/ LGA   - Start pushing     Franklyn Rock MD  [facility]

## 2025-07-25 NOTE — DISCHARGE SUMMARY
Obstetrical Discharge Summary     Name: Najma Camacho MRN: [id number]  SSN: [id number]    YOB: 2000  Age: 25 y.o.  Sex: female      Admit Date: 2025    Discharge Date: 2025      Attending Physician:  Imani Melo MD     Delivering Physician:  Imani Melo MD     * Admission Diagnoses:   IUP @ 37w3d  T2DM -ID      * Discharge Diagnoses:   Delivery of a viable infant via  by Franklyn Rock MD on 2025.  Apgars were 8 and 9.    Same as above         Additional Diagnoses:      No results found for: \"RUBELLAEXT\", \"GRBSEXT\"     There is no immunization history on file for this patient.    * Procedures:            * Discharge Condition: good    * Hospital Course: Normal hospital course following the delivery.    * Disposition: Home    Discharge Medications:      Medication List        CONTINUE taking these medications      Insulin Pen Needle 32G X 6 MM Misc  Please use to administer insulin daily.     ONE TOUCH ULTRA 2 w/Device Kit  Please use glucometer preferred by the pt.'s ins.     OneTouch Delica Plus [id number] Misc            ASK your doctor about these medications      aspirin 81 MG EC tablet     HumuLIN N KwikPen 100 UNIT/ML injection pen  Generic drug: insulin NPH  Inject 13 Units into the skin every morning AND 13 Units nightly.     insulin lispro (1 Unit Dial) 100 UNIT/ML Sopn  Commonly known as: HumaLOG KwikPen  Inject 5 Units into the skin daily (with breakfast) AND 5 Units Daily with lunch AND 5 Units Daily with supper.     metFORMIN 500 MG tablet  Commonly known as: GLUCOPHAGE     OneTouch Ultra Test strip  Generic drug: blood glucose test strips     PRENATAL 1 PO              * Follow-up Care/Patient Instructions:  Activity: Activity as tolerated  Diet: Regular Diet  Wound Care: As directed  Followup 4-6 weeks for PP check        Signed By:  Imani Melo MD  [facility]     2025

## 2025-07-25 NOTE — PROGRESS NOTES
Progress note    Checked patient 2/2 pressure. 5/70/caput to-1.   Iupc in     Advised will recheck this pm. Discussed may need a cs but we will assess for change later as arom was only just now performed at 4 am

## 2025-07-25 NOTE — PROGRESS NOTES
0700  Bedside and Verbal shift change report given to ANNIE Fuller  (oncoming nurse) by RUBY Ferrer  (offgoing nurse). Report included the following information Adult Overview, Surgery Report, Intake/Output, MAR, and Recent Results.    0904  MD Rock at bedside, SVE 5/70/-1. Pt feeling pain/pressure, CRNA notified at this time.  1300  MD Rock at bedside, SVE 7/100/0, IUPC replaced at this time  1308  See MAR for interventions   1429  MD Rock at bedside, SVE 10/100//0  1430  Patient actively pushing.  RN remains in continuous attendance at the bedside.  Assessment & evaluation of fetal heart rate ongoing via continuous EFM.   1557  RN remained at bedside throughout pushing.  EFM continuously assessed.  Vaginal delivery of viable infant.     Delivery   Total       TRANSFER - OUT REPORT:    Verbal report given to ELLI Pozo on Najma Camacho  being transferred to MIU  for routine progression of patient care       Report consisted of patient's Situation, Background, Assessment and   Recommendations(SBAR).     Information from the following report(s) Adult Overview and Surgery Report was reviewed with the receiving nurse.           Lines:   Peripheral IV 07/23/25 Distal;Right;Anterior Forearm (Active)   Site Assessment Clean, dry & intact 07/25/25 0329   Line Status Infusing 07/25/25 0329   Line Care Connections checked and tightened 07/25/25 0329   Phlebitis Assessment No symptoms 07/25/25 0329   Infiltration Assessment 0 07/25/25 0329   Alcohol Cap Used Yes 07/24/25 0040   Dressing Status Clean, dry & intact 07/25/25 0329   Dressing Type Transparent 07/25/25 0329        Opportunity for questions and clarification was provided.      Patient transported with:  Registered Nurse

## 2025-07-25 NOTE — L&D DELIVERY NOTE
Tania Camacho [[id number]]      Labor Events     Steroids: None  Cervical Ripening Date/Time:        Rupture Date/Time:  25 03:56:00   Rupture Type: Intact  Fluid Color: Clear  Fluid Odor: None       Anesthesia    Method: Epidural       Labor Event Times      Labor onset date/time:        Dilation complete date/time:  25 14:29:00     Start pushing date/time:  2025 14:30:00   Decision date/time (emergent ):            Delivery Details      Delivery Date: 25 Delivery Time: 15:57:00   Delivery Type: Vaginal, Spontaneous              [place] Presentation    Presentation: Vertex  Position: Left  _: Occiput  _: Anterior       Shoulder Dystocia    Shoulder Dystocia Present?: No       Assisted Delivery Details    Forceps Attempted?: No  Vacuum Extractor Attempted?: No                           Cord    Vessels: 3 Vessels  Complications: None  Delayed Cord Clamping?: Yes  Cord Clamped Date/Time: 2025 15:59:00  Cord Blood Disposition: Lab  Gases Sent?: No              Placenta    Date/Time: 2025 16:03:48  Removal: Spontaneous  Appearance: Intact  Disposition: Discarded       Lacerations    Episiotomy: None  Perineal Lacerations: 1st  Other Lacerations: vaginal laceration  Vaginal Laceration?: Yes Repaired?: Yes   Number of Repair Packets: 1       Blood Loss  Mother: Najma Camacho #[id number]     Start of Mother's Information      Delivery Blood Loss   Intrapartum & Postpartum: 25 0357 - 25 1613    Delivery Admission: 25 1634 - 25 1613         Intrapartum & Postpartum Delivery Admission    None                  End of Mother's Information  Mother: Najma Camacho #[id number]                Delivery Providers    Delivering clinician:      Provider Role     Obstetrician     Primary Nurse     Primary Farrell Nurse     NICU Nurse     Neonatologist     Anesthesiologist     Nurse Anesthetist     Nurse Practitioner     Midwife     Nursery Nurse

## 2025-07-25 NOTE — PROGRESS NOTES
[facility] Labor Progress Note     Patient: Najma Camacho MRN: [id number]  SSN: [id number]    YOB: 2000  Age: 25 y.o.  Sex: female        Subjective:   Patient coping well with contractions since receiving her epidural.       Objective:   Blood pressure (!) 117/59, pulse 68, temperature 98.2 °F (36.8 °C), temperature source Oral, resp. rate 16, height 1.6 m (5' 3\"), weight 88.5 kg (195 lb), last menstrual period 2024, SpO2 99%.      Fetal heart baseline 145, moderate  variability, accelerations present, decelerations not present, Uterine contractions q 2-3 minutes, mild to moderate , resting tone soft.    Sterile Vaginal Exam: Tight 4 cm dilated/   60 % effaced/ -3 station, midline, medium,  fetal presentation vertex, membranes intact     Fetal presentation = LOT per beside ultrasound.     Assessment:     37w2d  Category 1 fetal heart rate tracing   Early Labor   GBS: Negative   Type 2 DM  LGA : EFW 7/10 3785 >99%ile, AC >99%     Plan:   Continue current orders/management     Pt's cervix has basically not had any cervical change or station change since 02:57 pm today. Pt has been on IV Pitocin since 7 am today 2025.     Reviewed with pt my recommendation of stopping IV Pitocin now, and restarting Cytotec.  Cytotec 50 mcg po x1 dose at 23:45 and then again 2 hours later.     Consideration of AROM after both doses of cytotec is given.     Blood sugars: latest blood sugar 132.     CNM management     Anticipate     KELLY Vick CNM

## 2025-07-25 NOTE — PROGRESS NOTES
Labor Note    Najma Camacho  [id number]  2000   37w3d      S:  Feeling fine. Arrived to room for recurrent lates.     O:    /60   Pulse 62   Temp 97.8 °F (36.6 °C) (Temporal)   Resp 16   Ht 1.6 m (5' 3\")   Wt 88.5 kg (195 lb)   LMP 2024   SpO2 99%   BMI 34.54 kg/m²        No data found.    GEN NAD   RESP Nonlabored, symmetric chest rise  SVE 7/c/0, replaced IUPC  EXTREM Symmetric lower extremities     /mod/+a/recurrent late decels   [place] 2 min       A/P:  25 y.o.  @ 37w3d - IOL  T2DM; LGA >99th%ile ~8.3 lb 7/10   - GBS neg / Rhpos  - FWB:  CEFM/[place]  - Labor course: start amnioinfusion now, reposition, half pit from 10 to 5, monitor closely. Overall reassuring with moderate variability and good cervical change. Pt understanding that  may still be needed pending FHR/dilation, but we have made good progress   - Pain control - PCEA  - Anticipate .      Franklyn Rock MD  [facility]

## 2025-07-25 NOTE — ANESTHESIA PROCEDURE NOTES
Epidural Block    Patient location during procedure: OB  Start time: 7/24/2025 8:16 PM  End time: 7/24/2025 8:28 PM  Reason for block: labor epidural  Staffing  Resident/CRNA: Kartik Maurer APRN - CRNA  Performed by: Kartik Maurer APRN - CRNA  Authorized by: Ghassan Flaherty DO    Epidural  Patient position: sitting  Prep: Betadine  Patient monitoring: continuous pulse ox and frequent blood pressure checks  Approach: midline  Location: L3-4  Injection technique: KARIN air and KARIN saline  Guidance: paresthesia technique  Provider prep: sterile gloves and mask  Needle  Needle type: Tuohy   Needle gauge: 17 G  Needle length: 3.5 in  Needle insertion depth: 6 cm  Catheter type: multi-orifice  Catheter size: 20 G  Catheter at skin depth: 12 cm  Test dose: negativeCatheter Secured: tegaderm and tape  Assessment  Sensory level: T6  Hemodynamics: stable  Attempts: 1  Outcomes: uncomplicated and patient tolerated procedure well  Preanesthetic Checklist  Completed: patient identified, IV checked, site marked, risks and benefits discussed, surgical/procedural consents, equipment checked, pre-op evaluation, timeout performed, anesthesia consent given, oxygen available, monitors applied/VS acknowledged, fire risk safety assessment completed and verbalized and blood product R/B/A discussed and consented

## 2025-07-25 NOTE — ANESTHESIA POSTPROCEDURE EVALUATION
Department of Anesthesiology  Postprocedure Note    Patient: Najma Camacho  MRN: [id number]  YOB: 2000  Date of evaluation: 7/25/2025    Procedure Summary       Date: 07/24/25 Room / Location:     Anesthesia Start: 2003 Anesthesia Stop: 07/25/25 1557    Procedure: Labor Analgesia Diagnosis:     Scheduled Providers:  Responsible Provider: Sumeet Cruz MD    Anesthesia Type: regional, epidural ASA Status: 2            Anesthesia Type: No value filed.    [place] Phase I:      [place] Phase II:      Anesthesia Post Evaluation    Patient location during evaluation: bedside  Level of consciousness: awake  Pain score: 0  Airway patency: patent  Nausea & Vomiting: no nausea  Cardiovascular status: hemodynamically stable  Respiratory status: acceptable  Hydration status: euvolemic  Pain management: adequate    No notable events documented.

## 2025-07-26 LAB
GLUCOSE BLD STRIP.AUTO-MCNC: 94 MG/DL (ref 65–117)
SERVICE CMNT-IMP: NORMAL

## 2025-07-26 PROCEDURE — 6370000000 HC RX 637 (ALT 250 FOR IP): Performed by: OBSTETRICS & GYNECOLOGY

## 2025-07-26 PROCEDURE — 6370000000 HC RX 637 (ALT 250 FOR IP): Performed by: STUDENT IN AN ORGANIZED HEALTH CARE EDUCATION/TRAINING PROGRAM

## 2025-07-26 PROCEDURE — 82962 GLUCOSE BLOOD TEST: CPT

## 2025-07-26 PROCEDURE — 1120000000 HC RM PRIVATE OB

## 2025-07-26 RX ORDER — OXYCODONE HYDROCHLORIDE 5 MG/1
5 TABLET ORAL EVERY 4 HOURS PRN
Refills: 0 | Status: DISCONTINUED | OUTPATIENT
Start: 2025-07-26 | End: 2025-07-27 | Stop reason: HOSPADM

## 2025-07-26 RX ADMIN — IBUPROFEN 800 MG: 800 TABLET, FILM COATED ORAL at 08:47

## 2025-07-26 RX ADMIN — ACETAMINOPHEN 1000 MG: 500 TABLET ORAL at 12:41

## 2025-07-26 RX ADMIN — DOCUSATE SODIUM 100 MG: 100 CAPSULE, LIQUID FILLED ORAL at 20:05

## 2025-07-26 RX ADMIN — IBUPROFEN 800 MG: 800 TABLET, FILM COATED ORAL at 18:14

## 2025-07-26 RX ADMIN — DOCUSATE SODIUM 100 MG: 100 CAPSULE, LIQUID FILLED ORAL at 08:47

## 2025-07-26 RX ADMIN — ACETAMINOPHEN 1000 MG: 500 TABLET ORAL at 03:35

## 2025-07-26 RX ADMIN — ACETAMINOPHEN 1000 MG: 500 TABLET ORAL at 20:05

## 2025-07-26 RX ADMIN — OXYCODONE 5 MG: 5 TABLET ORAL at 12:41

## 2025-07-26 ASSESSMENT — PAIN SCALES - GENERAL
PAINLEVEL_OUTOF10: 4
PAINLEVEL_OUTOF10: 7
PAINLEVEL_OUTOF10: 4
PAINLEVEL_OUTOF10: 5
PAINLEVEL_OUTOF10: 7

## 2025-07-26 ASSESSMENT — PAIN DESCRIPTION - DESCRIPTORS
DESCRIPTORS: ACHING
DESCRIPTORS: ACHING;CRAMPING;SORE
DESCRIPTORS: ACHING;CRAMPING;SORE

## 2025-07-26 ASSESSMENT — PAIN DESCRIPTION - ORIENTATION
ORIENTATION: ANTERIOR;LOWER
ORIENTATION: RIGHT
ORIENTATION: RIGHT
ORIENTATION: ANTERIOR;LOWER
ORIENTATION: RIGHT

## 2025-07-26 ASSESSMENT — PAIN DESCRIPTION - LOCATION
LOCATION: ABDOMEN;LEG;BACK
LOCATION: ABDOMEN
LOCATION: ABDOMEN
LOCATION: GENERALIZED
LOCATION: ABDOMEN;BACK;LEG

## 2025-07-26 NOTE — PROGRESS NOTES
Spoke with rounding OB dr franco about patients blood sugars and insulin ordered. Okay to d/c insulin and blood sugar checks.

## 2025-07-26 NOTE — LACTATION NOTE
This note was copied from a baby's chart.     25 1027   Visit Information   Lactation Consult Visit Type IP Initial Consult   Visit Length 45 minutes   Reason for Visit Education;Normal [place] Visit   Breast Feeding History/Assessment   Left Breast Soft   Left Nipple Protrude   Right Nipple Protrude   Right Breast Soft   Breastfeeding History N/A   Feeding Assessment: Maternal Factors   Position and Latch With assistance   Signs of Transfer Nutritive sucking;Uterine cramping   Maternal Response Tense   Right Side Feeding   Infant Latch Observations Rooting;Wide open mouth;Good latch on;Sustained rhythmic suck   Infant Position Football   Infant Response to Feeding Feeding well   LATCH Documentation   Latch 2   Audible Swallowing 1   Type of Nipple 2   Comfort (Breast/Nipple) 2   Hold (Positioning) 0   LATCH Score 7   Care Plan/Breast Care   Breast Care Using breast pump;Lanolin provided;Pumping supply provided   Lactation Comment Lactation education and support     This is mother's first baby. She reports pain with latch. Baby is LGA born at 37.3 weeks gestation and is getting glucose checks. Baby has had formula supplement for low glucose at last feeding this morning.   Mother educated on benefits of skin to skin, hand expression, and normal LPT  behaviors. BF basics reviewed.  Assisted mother to obtain a good latch with wide gape and rhythmic suck in both cradled and football positions. Mother is very sensitive, she reports pain even with hand expression prior to delivery.   Baby oral assessment WNL.  Mother set up with a [employer] Symphony breast pump, and demonstrated use for stimulation/supply if latch pain persists or if mother decides to exclusively pump ( per mother's request.)    Pt will successfully establish breastfeeding by feeding in response to early feeding cues   or wake every 3h, will obtain deep latch, and will keep log of feedings/output.  Taught to BF at hunger cues and or q 2-3 hrs

## 2025-07-26 NOTE — PROGRESS NOTES
PostPartum Note    Najma Camacho  [id number]  2000  25 y.o.    S:  Ms. Najma Camacho is a 25 y.o.  PPD #1 s/p  @ 37w3d.  Doing well.  She had a baby boy.  Her lochia is like a period.  She describes her pain as mild and is well controlled with PO medications.  She is breast feeding.  She is ambulating and voiding.  Tolerating PO intake.      O:   /66   Pulse 56   Temp 97.9 °F (36.6 °C) (Oral)   Resp 16   Ht 1.6 m (5' 3\")   Wt 88.5 kg (195 lb)   LMP 2024   SpO2 99%   Breastfeeding Unknown   BMI 34.54 kg/m²     Lab Results   Component Value Date/Time    WBC 7.6 2025 05:19 PM    HGB 10.2 2025 05:19 PM    HCT 31.6 2025 05:19 PM     2025 05:19 PM    MCV 75.2 2025 05:19 PM       Gen - No acute distress  Abdomen - Fundus firm, below the umbilicus   Ext - Warm, well perfused.  Nontender    A/P:  PPD #1 s/p  @ 37w3d doing well.    1.  Routine PP instructions/ care discussed - d/c SSI and Accuchecks as she was dx with DM in pregnancy only.  Per records T2DM 2/2 early Dx.  Will f/u outpatient.   2.  Blood type - Rh +  3.  Circumcision declined    4.  Discharge PPD#2    5.  F/U 4-6 weeks for PP check.      Deepti Ambrosio MD  [facility]

## 2025-07-26 NOTE — PROGRESS NOTES
2130-Dr Franks notified patient blood sugar 234. New orders received to administer 6 units insulin lispro with no re-check until before breakfast.   Patient denies any signs or symptoms at this time. Will continue to monitor.

## 2025-07-27 VITALS
DIASTOLIC BLOOD PRESSURE: 68 MMHG | TEMPERATURE: 97.5 F | OXYGEN SATURATION: 100 % | RESPIRATION RATE: 16 BRPM | HEIGHT: 63 IN | BODY MASS INDEX: 34.55 KG/M2 | HEART RATE: 58 BPM | WEIGHT: 195 LBS | SYSTOLIC BLOOD PRESSURE: 119 MMHG

## 2025-07-27 PROCEDURE — 6370000000 HC RX 637 (ALT 250 FOR IP): Performed by: STUDENT IN AN ORGANIZED HEALTH CARE EDUCATION/TRAINING PROGRAM

## 2025-07-27 RX ADMIN — IBUPROFEN 800 MG: 800 TABLET, FILM COATED ORAL at 02:27

## 2025-07-27 RX ADMIN — IBUPROFEN 800 MG: 800 TABLET, FILM COATED ORAL at 09:42

## 2025-07-27 RX ADMIN — ACETAMINOPHEN 1000 MG: 500 TABLET ORAL at 05:53

## 2025-07-27 RX ADMIN — FAMOTIDINE 20 MG: 20 TABLET, FILM COATED ORAL at 09:41

## 2025-07-27 RX ADMIN — DOCUSATE SODIUM 100 MG: 100 CAPSULE, LIQUID FILLED ORAL at 09:42

## 2025-07-27 RX ADMIN — POLYETHYLENE GLYCOL 3350 17 G: 17 POWDER, FOR SOLUTION ORAL at 09:41

## 2025-07-27 ASSESSMENT — PAIN DESCRIPTION - LOCATION
LOCATION: ABDOMEN
LOCATION: GENERALIZED
LOCATION: ABDOMEN

## 2025-07-27 ASSESSMENT — PAIN DESCRIPTION - DESCRIPTORS
DESCRIPTORS: ACHING;SORE
DESCRIPTORS: CRAMPING;SORE
DESCRIPTORS: SORE

## 2025-07-27 ASSESSMENT — PAIN DESCRIPTION - ORIENTATION
ORIENTATION: ANTERIOR;LOWER
ORIENTATION: ANTERIOR;LOWER

## 2025-07-27 ASSESSMENT — PAIN SCALES - GENERAL
PAINLEVEL_OUTOF10: 2
PAINLEVEL_OUTOF10: 2

## 2025-07-27 ASSESSMENT — PAIN - FUNCTIONAL ASSESSMENT: PAIN_FUNCTIONAL_ASSESSMENT: ACTIVITIES ARE NOT PREVENTED

## 2025-07-27 NOTE — LACTATION NOTE
This note was copied from a baby's chart.  Mother has been feeding baby organic pure bliss formula and has been using [employer] pump. Normal  behaviors and output expectations reviewed. Mother encouraged to call before discharge if she would like LC to observe a feed at the breast or if she has any further questions or concerns.    Pumping:  Guidelines for pumping, milk collection and storage, proper cleaning of pump parts all reviewed.  How to establish and maintain breast milk supply through pumping reviewed.  Differences between hospital grade rental pumps vs store bought double electric/hand pumps discussed. Mother has a manual pump and [employer]ra pump for home use. Sized for proper size flanges.     Left Breast: Soft  Left Nipple: Protrude  Right Nipple: Protrude  Right Breast: Soft  Position and Latch: With assistance  Signs of Transfer: Nutritive sucking, Uterine cramping  Maternal Response: Tense      Formula Type: Other (Comment) (similac pure bliss organic)     Latch:  (Not seen at breast)  Audible Swallowing: A few with stimulation  Type of Nipple: Everted (after stimulation)  Comfort (Breast/Nipple): Soft/non-tender  Hold (Positioning): Full assist, staff holds infant at breast  LATCH Score: 7  Breast Care: Using breast pump, Lanolin provided, Pumping supply provided  Care Plan Initiated: Reluctant nurser  Lactation Comment: Discharge lactation education and support provided.    Engorgement Care Guidelines:  Reviewed how milk is made and normal phases of milk production.  Taught care of engorged breasts - physiologic breastfeeding encouraged with use of cool packs (no ice directly on skin). Consider use of NSAIDS where appropriate for discomfort and inflammation. Can employ light touch, lymphatic drainage techniques on tender grandular tissues. Anticipatory guidance shared.    Chart shows numerous feedings, void, stool WNL.  Discussed importance of monitoring outputs and feedings on first week

## 2025-07-27 NOTE — DISCHARGE INSTRUCTIONS
Caring for Yourself After Vaginal Delivery (02:57)  Your health professional recommends that you watch this short online health video.  Learn ways to care for yourself after you give birth.   Purpose: Apply self-care instructions, and know when to seek care following childbirth.  Goal: Apply self-care instructions, and know when to seek care following childbirth.    Watch: Scan the QR code or visit the link to view video       [email or web address]  Current as of: 2024  Content Version: 14.5   Sen[employer].   Care instructions adapted under license by [employer]. If you have questions about a medical condition or this instruction, always ask your healthcare professional. [employer], disclaims any warranty or liability for your use of this information.       Postpartum Care at Home With Your Baby: Care Instructions  Overview     After childbirth (postpartum period), your body goes through many changes as you recover. In these weeks after delivery, try to take good care of yourself. Get rest whenever you can and accept help from others.  It may take 4 to 6 weeks to feel like yourself again, and possibly longer if you had a  birth. You may feel sore or very tired as you recover. After delivery, you may continue to have contractions as the uterus returns to the size it was before your pregnancy. You will also have some vaginal bleeding. And you may have pain around the vagina as you heal. Several days after delivery you may also have pain and swelling in your breasts as they fill with milk. There are things you can do at home to help ease these discomforts.  After childbirth, it's common to feel emotional. You may feel irritable, cry easily, and feel happy one minute and sad the next. This is called the \"baby blues.\" Hormone changes are one cause of these emotional changes. These feelings usually get better within a couple of weeks. If they don't, talk

## 2025-07-27 NOTE — PROGRESS NOTES
PostPartum Note    Najma Camacho  [id number]  2000  25 y.o.    S:  Ms. Najma Camacho is a 25 y.o.  PPD #2 s/p  @ 37w3d.  Doing well.  She had a baby boy.  Her lochia is like a period.  She describes her pain as mild and is well controlled with PO medications.  She is breast feeding.  She is ambulating and voiding.  Tolerating PO intake.      O:   /68   Pulse 58   Temp 97.5 °F (36.4 °C)   Resp 16   Ht 1.6 m (5' 3\")   Wt 88.5 kg (195 lb)   LMP 2024   SpO2 100%   Breastfeeding Unknown   BMI 34.54 kg/m²     Lab Results   Component Value Date/Time    WBC 7.6 2025 05:19 PM    HGB 10.2 2025 05:19 PM    HCT 31.6 2025 05:19 PM     2025 05:19 PM    MCV 75.2 2025 05:19 PM       Gen - No acute distress  Abdomen - Fundus firm, below the umbilicus   Ext - Warm, well perfused.  Nontender    A/P:  PPD #2 s/p  @ 37w3d doing well.    1.  Routine PP instructions/ care discussed  2.  Blood type - Rh +  3.  Circumcision - initially declined, now desired.  Consented.  Will do today    4.  Discharge today    5.  F/U 4-6 weeks for PP check.      Deepti Ambrosio MD  [facility]